# Patient Record
Sex: MALE | Race: BLACK OR AFRICAN AMERICAN | NOT HISPANIC OR LATINO | Employment: OTHER | URBAN - METROPOLITAN AREA
[De-identification: names, ages, dates, MRNs, and addresses within clinical notes are randomized per-mention and may not be internally consistent; named-entity substitution may affect disease eponyms.]

---

## 2018-02-05 ENCOUNTER — TELEPHONE (OUTPATIENT)
Dept: NEUROSURGERY | Facility: CLINIC | Age: 58
End: 2018-02-05

## 2018-02-05 NOTE — TELEPHONE ENCOUNTER
----- Message from Stacia Bright sent at 2/5/2018 12:40 PM CST -----  Contact: Stacia (sister) @ [phone]  Calling to schedule a 1 year f/u with Dr Gonzalez.  Pls call with an appt.

## 2018-02-05 NOTE — TELEPHONE ENCOUNTER
YARITZA ESTES, PTS SISTER, PT WAS DC IN 11/2016 FROM [facility]. EYESIGHT IS STABLE & PT IS DOING WELL. HAD QUESTIONS ABOUT HIS CARBAMAZEPINE, DIRECTED TO NEUROLOGY TO FU AS THEY PRESCRIBE THAT MEDICATION. SHE UNDERSTOOD.

## 2024-08-10 ENCOUNTER — HOSPITAL ENCOUNTER (INPATIENT)
Facility: OTHER | Age: 64
LOS: 2 days | Discharge: HOME | DRG: 641 | End: 2024-08-12
Attending: EMERGENCY MEDICINE | Admitting: HOSPITALIST
Payer: MEDICARE

## 2024-08-10 DIAGNOSIS — R53.1 GENERALIZED WEAKNESS: ICD-10-CM

## 2024-08-10 DIAGNOSIS — E87.1 HYPONATREMIA: Primary | ICD-10-CM

## 2024-08-10 DIAGNOSIS — R07.9 CHEST PAIN: ICD-10-CM

## 2024-08-10 DIAGNOSIS — R06.02 SOB (SHORTNESS OF BREATH): ICD-10-CM

## 2024-08-10 PROBLEM — C34.32 PRIMARY CANCER OF LEFT LOWER LOBE OF LUNG: Status: ACTIVE | Noted: 2024-06-26

## 2024-08-10 LAB
ALBUMIN SERPL BCP-MCNC: 3.8 G/DL (ref 3.5–5.2)
ALP SERPL-CCNC: 100 U/L (ref 55–135)
ALT SERPL W/O P-5'-P-CCNC: 19 U/L (ref 10–44)
ANION GAP SERPL CALC-SCNC: 11 MMOL/L (ref 8–16)
AST SERPL-CCNC: 27 U/L (ref 10–40)
BASOPHILS # BLD AUTO: 0.02 K/UL (ref 0–0.2)
BASOPHILS NFR BLD: 0.3 % (ref 0–1.9)
BILIRUB SERPL-MCNC: 0.3 MG/DL (ref 0.1–1)
BILIRUB UR QL STRIP: NEGATIVE
BUN SERPL-MCNC: 10 MG/DL (ref 8–23)
CALCIUM SERPL-MCNC: 8.8 MG/DL (ref 8.7–10.5)
CHLORIDE SERPL-SCNC: 88 MMOL/L (ref 95–110)
CLARITY UR: CLEAR
CO2 SERPL-SCNC: 23 MMOL/L (ref 23–29)
COLOR UR: COLORLESS
CREAT SERPL-MCNC: 0.9 MG/DL (ref 0.5–1.4)
CTP QC/QA: YES
DIFFERENTIAL METHOD BLD: ABNORMAL
EOSINOPHIL # BLD AUTO: 0 K/UL (ref 0–0.5)
EOSINOPHIL NFR BLD: 0 % (ref 0–8)
ERYTHROCYTE [DISTWIDTH] IN BLOOD BY AUTOMATED COUNT: 12.3 % (ref 11.5–14.5)
EST. GFR  (NO RACE VARIABLE): >60 ML/MIN/1.73 M^2
GLUCOSE SERPL-MCNC: 99 MG/DL (ref 70–110)
GLUCOSE UR QL STRIP: NEGATIVE
HCT VFR BLD AUTO: 34.5 % (ref 40–54)
HGB BLD-MCNC: 12.7 G/DL (ref 14–18)
HGB UR QL STRIP: NEGATIVE
IMM GRANULOCYTES # BLD AUTO: 0.02 K/UL (ref 0–0.04)
IMM GRANULOCYTES NFR BLD AUTO: 0.3 % (ref 0–0.5)
KETONES UR QL STRIP: NEGATIVE
LEUKOCYTE ESTERASE UR QL STRIP: NEGATIVE
LYMPHOCYTES # BLD AUTO: 1.4 K/UL (ref 1–4.8)
LYMPHOCYTES NFR BLD: 24.1 % (ref 18–48)
MCH RBC QN AUTO: 31.7 PG (ref 27–31)
MCHC RBC AUTO-ENTMCNC: 36.8 G/DL (ref 32–36)
MCV RBC AUTO: 86 FL (ref 82–98)
MONOCYTES # BLD AUTO: 0.6 K/UL (ref 0.3–1)
MONOCYTES NFR BLD: 10.6 % (ref 4–15)
NEUTROPHILS # BLD AUTO: 3.8 K/UL (ref 1.8–7.7)
NEUTROPHILS NFR BLD: 64.7 % (ref 38–73)
NITRITE UR QL STRIP: NEGATIVE
NRBC BLD-RTO: 0 /100 WBC
OSMOLALITY SERPL: 256 MOSM/KG (ref 280–300)
OSMOLALITY UR: 248 MOSM/KG (ref 50–1200)
PH UR STRIP: 6 [PH] (ref 5–8)
PLATELET # BLD AUTO: 183 K/UL (ref 150–450)
PMV BLD AUTO: 9 FL (ref 9.2–12.9)
POTASSIUM SERPL-SCNC: 4 MMOL/L (ref 3.5–5.1)
PROT SERPL-MCNC: 7.6 G/DL (ref 6–8.4)
PROT UR QL STRIP: NEGATIVE
RBC # BLD AUTO: 4.01 M/UL (ref 4.6–6.2)
SARS-COV-2 RDRP RESP QL NAA+PROBE: NEGATIVE
SODIUM SERPL-SCNC: 122 MMOL/L (ref 136–145)
SODIUM UR-SCNC: 39 MMOL/L (ref 20–250)
SP GR UR STRIP: 1.01 (ref 1–1.03)
URN SPEC COLLECT METH UR: ABNORMAL
UROBILINOGEN UR STRIP-ACNC: NEGATIVE EU/DL
WBC # BLD AUTO: 5.94 K/UL (ref 3.9–12.7)

## 2024-08-10 PROCEDURE — 63600175 PHARM REV CODE 636 W HCPCS: Performed by: EMERGENCY MEDICINE

## 2024-08-10 PROCEDURE — 12000002 HC ACUTE/MED SURGE SEMI-PRIVATE ROOM

## 2024-08-10 PROCEDURE — 20000000 HC ICU ROOM

## 2024-08-10 PROCEDURE — 84300 ASSAY OF URINE SODIUM: CPT | Performed by: EMERGENCY MEDICINE

## 2024-08-10 PROCEDURE — 96360 HYDRATION IV INFUSION INIT: CPT

## 2024-08-10 PROCEDURE — 93005 ELECTROCARDIOGRAM TRACING: CPT

## 2024-08-10 PROCEDURE — 99285 EMERGENCY DEPT VISIT HI MDM: CPT | Mod: 25

## 2024-08-10 PROCEDURE — 87635 SARS-COV-2 COVID-19 AMP PRB: CPT | Performed by: EMERGENCY MEDICINE

## 2024-08-10 PROCEDURE — 81003 URINALYSIS AUTO W/O SCOPE: CPT | Performed by: PHYSICIAN ASSISTANT

## 2024-08-10 PROCEDURE — 83930 ASSAY OF BLOOD OSMOLALITY: CPT | Performed by: PHYSICIAN ASSISTANT

## 2024-08-10 PROCEDURE — 85025 COMPLETE CBC W/AUTO DIFF WBC: CPT | Performed by: PHYSICIAN ASSISTANT

## 2024-08-10 PROCEDURE — 93010 ELECTROCARDIOGRAM REPORT: CPT | Mod: ,,, | Performed by: INTERNAL MEDICINE

## 2024-08-10 PROCEDURE — 80053 COMPREHEN METABOLIC PANEL: CPT | Performed by: PHYSICIAN ASSISTANT

## 2024-08-10 PROCEDURE — 83935 ASSAY OF URINE OSMOLALITY: CPT | Performed by: EMERGENCY MEDICINE

## 2024-08-10 RX ORDER — BENZTROPINE MESYLATE 1 MG/1
1 TABLET ORAL 2 TIMES DAILY
Status: DISCONTINUED | OUTPATIENT
Start: 2024-08-11 | End: 2024-08-12 | Stop reason: HOSPADM

## 2024-08-10 RX ORDER — UMECLIDINIUM BROMIDE AND VILANTEROL TRIFENATATE 62.5; 25 UG/1; UG/1
1 POWDER RESPIRATORY (INHALATION) DAILY
COMMUNITY
Start: 2024-06-10 | End: 2025-06-10

## 2024-08-10 RX ORDER — ALBUTEROL SULFATE 90 UG/1
2 INHALANT RESPIRATORY (INHALATION) EVERY 4 HOURS PRN
COMMUNITY
Start: 2024-03-21

## 2024-08-10 RX ORDER — ALUMINUM HYDROXIDE, MAGNESIUM HYDROXIDE, AND SIMETHICONE 1200; 120; 1200 MG/30ML; MG/30ML; MG/30ML
30 SUSPENSION ORAL 4 TIMES DAILY PRN
Status: DISCONTINUED | OUTPATIENT
Start: 2024-08-11 | End: 2024-08-12 | Stop reason: HOSPADM

## 2024-08-10 RX ORDER — SIMETHICONE 80 MG
1 TABLET,CHEWABLE ORAL 4 TIMES DAILY PRN
Status: DISCONTINUED | OUTPATIENT
Start: 2024-08-11 | End: 2024-08-12 | Stop reason: HOSPADM

## 2024-08-10 RX ORDER — RISPERIDONE 1 MG/1
3 TABLET ORAL DAILY
Status: DISCONTINUED | OUTPATIENT
Start: 2024-08-11 | End: 2024-08-11

## 2024-08-10 RX ORDER — NALOXONE HCL 0.4 MG/ML
0.02 VIAL (ML) INJECTION
Status: DISCONTINUED | OUTPATIENT
Start: 2024-08-11 | End: 2024-08-12 | Stop reason: HOSPADM

## 2024-08-10 RX ORDER — TALC
6 POWDER (GRAM) TOPICAL NIGHTLY PRN
Status: DISCONTINUED | OUTPATIENT
Start: 2024-08-11 | End: 2024-08-12 | Stop reason: HOSPADM

## 2024-08-10 RX ORDER — PROCHLORPERAZINE EDISYLATE 5 MG/ML
5 INJECTION INTRAMUSCULAR; INTRAVENOUS EVERY 6 HOURS PRN
Status: DISCONTINUED | OUTPATIENT
Start: 2024-08-11 | End: 2024-08-12 | Stop reason: HOSPADM

## 2024-08-10 RX ORDER — ACETAMINOPHEN 325 MG/1
650 TABLET ORAL EVERY 8 HOURS PRN
Status: DISCONTINUED | OUTPATIENT
Start: 2024-08-11 | End: 2024-08-12 | Stop reason: HOSPADM

## 2024-08-10 RX ORDER — ONDANSETRON HYDROCHLORIDE 2 MG/ML
4 INJECTION, SOLUTION INTRAVENOUS EVERY 6 HOURS PRN
Status: DISCONTINUED | OUTPATIENT
Start: 2024-08-11 | End: 2024-08-12 | Stop reason: HOSPADM

## 2024-08-10 RX ORDER — CARBAMAZEPINE 200 MG/1
200 TABLET ORAL EVERY MORNING
Status: DISCONTINUED | OUTPATIENT
Start: 2024-08-11 | End: 2024-08-12 | Stop reason: HOSPADM

## 2024-08-10 RX ORDER — AMOXICILLIN 250 MG
1 CAPSULE ORAL 2 TIMES DAILY
Status: DISCONTINUED | OUTPATIENT
Start: 2024-08-10 | End: 2024-08-12 | Stop reason: HOSPADM

## 2024-08-10 RX ORDER — ALBUTEROL SULFATE 90 UG/1
2 INHALANT RESPIRATORY (INHALATION) EVERY 4 HOURS PRN
Status: DISCONTINUED | OUTPATIENT
Start: 2024-08-11 | End: 2024-08-12 | Stop reason: HOSPADM

## 2024-08-10 RX ORDER — CARBAMAZEPINE 200 MG/1
400 TABLET ORAL NIGHTLY
Status: DISCONTINUED | OUTPATIENT
Start: 2024-08-11 | End: 2024-08-12 | Stop reason: HOSPADM

## 2024-08-10 RX ORDER — SODIUM CHLORIDE 0.9 % (FLUSH) 0.9 %
10 SYRINGE (ML) INJECTION EVERY 8 HOURS PRN
Status: DISCONTINUED | OUTPATIENT
Start: 2024-08-11 | End: 2024-08-12 | Stop reason: HOSPADM

## 2024-08-10 RX ADMIN — SODIUM CHLORIDE, POTASSIUM CHLORIDE, SODIUM LACTATE AND CALCIUM CHLORIDE 1000 ML: 600; 310; 30; 20 INJECTION, SOLUTION INTRAVENOUS at 08:08

## 2024-08-10 NOTE — FIRST PROVIDER EVALUATION
Emergency Department TeleTriage Encounter Note      CHIEF COMPLAINT    Chief Complaint   Patient presents with    Fatigue     Pt has stage 3 lung cancer and has been having severe fatigue in the last three days, wife states pt has become incontinent of urine and bowels since Friday.          VITAL SIGNS   Initial Vitals [08/10/24 1751]   BP Pulse Resp Temp SpO2   132/84 86 18 97.7 °F (36.5 °C) 97 %      MAP       --            ALLERGIES    Review of patient's allergies indicates:  No Known Allergies    PROVIDER TRIAGE NOTE  Patient presents with increased fatigue, weakness, urinary incontinence. He has stage 3 lung cancer still being worked up and has not started treatment yet.       ORDERS  Labs Reviewed - No data to display    ED Orders (720h ago, onward)      None              Virtual Visit Note: The provider triage portion of this emergency department evaluation and documentation was performed via [employer], a HIPAA-compliant telemedicine application, in concert with a tele-presenter in the room. A face to face patient evaluation with one of my colleagues will occur once the patient is placed in an emergency department room.      DISCLAIMER: This note was prepared with [employer] voice recognition transcription software. Garbled syntax, mangled pronouns, and other bizarre constructions may be attributed to that software system.

## 2024-08-11 LAB
ANION GAP SERPL CALC-SCNC: 8 MMOL/L (ref 8–16)
BASOPHILS # BLD AUTO: 0.02 K/UL (ref 0–0.2)
BASOPHILS NFR BLD: 0.4 % (ref 0–1.9)
BUN SERPL-MCNC: 9 MG/DL (ref 8–23)
CALCIUM SERPL-MCNC: 8.8 MG/DL (ref 8.7–10.5)
CHLORIDE SERPL-SCNC: 94 MMOL/L (ref 95–110)
CO2 SERPL-SCNC: 26 MMOL/L (ref 23–29)
CORTIS SERPL-MCNC: 8.1 UG/DL (ref 4.3–22.4)
CREAT SERPL-MCNC: 0.9 MG/DL (ref 0.5–1.4)
DIFFERENTIAL METHOD BLD: ABNORMAL
EOSINOPHIL # BLD AUTO: 0 K/UL (ref 0–0.5)
EOSINOPHIL NFR BLD: 0 % (ref 0–8)
ERYTHROCYTE [DISTWIDTH] IN BLOOD BY AUTOMATED COUNT: 12.3 % (ref 11.5–14.5)
EST. GFR  (NO RACE VARIABLE): >60 ML/MIN/1.73 M^2
GLUCOSE SERPL-MCNC: 93 MG/DL (ref 70–110)
HCT VFR BLD AUTO: 32.9 % (ref 40–54)
HGB BLD-MCNC: 11.8 G/DL (ref 14–18)
IMM GRANULOCYTES # BLD AUTO: 0.02 K/UL (ref 0–0.04)
IMM GRANULOCYTES NFR BLD AUTO: 0.4 % (ref 0–0.5)
LYMPHOCYTES # BLD AUTO: 1.7 K/UL (ref 1–4.8)
LYMPHOCYTES NFR BLD: 31.6 % (ref 18–48)
MCH RBC QN AUTO: 31.2 PG (ref 27–31)
MCHC RBC AUTO-ENTMCNC: 35.9 G/DL (ref 32–36)
MCV RBC AUTO: 87 FL (ref 82–98)
MONOCYTES # BLD AUTO: 0.7 K/UL (ref 0.3–1)
MONOCYTES NFR BLD: 12.5 % (ref 4–15)
NEUTROPHILS # BLD AUTO: 2.9 K/UL (ref 1.8–7.7)
NEUTROPHILS NFR BLD: 55.1 % (ref 38–73)
NRBC BLD-RTO: 0 /100 WBC
OHS QRS DURATION: 80 MS
OHS QRS DURATION: 82 MS
OHS QTC CALCULATION: 437 MS
OHS QTC CALCULATION: 439 MS
PLATELET # BLD AUTO: 170 K/UL (ref 150–450)
PMV BLD AUTO: 9 FL (ref 9.2–12.9)
POTASSIUM SERPL-SCNC: 3.9 MMOL/L (ref 3.5–5.1)
RBC # BLD AUTO: 3.78 M/UL (ref 4.6–6.2)
SODIUM SERPL-SCNC: 125 MMOL/L (ref 136–145)
SODIUM SERPL-SCNC: 128 MMOL/L (ref 136–145)
SODIUM SERPL-SCNC: 128 MMOL/L (ref 136–145)
SODIUM SERPL-SCNC: 129 MMOL/L (ref 136–145)
SODIUM SERPL-SCNC: 131 MMOL/L (ref 136–145)
SODIUM SERPL-SCNC: 134 MMOL/L (ref 136–145)
TSH SERPL DL<=0.005 MIU/L-ACNC: 1.85 UIU/ML (ref 0.4–4)
WBC # BLD AUTO: 5.26 K/UL (ref 3.9–12.7)

## 2024-08-11 PROCEDURE — 25000003 PHARM REV CODE 250: Performed by: NURSE PRACTITIONER

## 2024-08-11 PROCEDURE — 20000000 HC ICU ROOM

## 2024-08-11 PROCEDURE — 84295 ASSAY OF SERUM SODIUM: CPT | Mod: 91 | Performed by: NURSE PRACTITIONER

## 2024-08-11 PROCEDURE — 63600175 PHARM REV CODE 636 W HCPCS: Performed by: HOSPITALIST

## 2024-08-11 PROCEDURE — 25000003 PHARM REV CODE 250: Performed by: INTERNAL MEDICINE

## 2024-08-11 PROCEDURE — 85025 COMPLETE CBC W/AUTO DIFF WBC: CPT | Performed by: NURSE PRACTITIONER

## 2024-08-11 PROCEDURE — 80048 BASIC METABOLIC PNL TOTAL CA: CPT | Performed by: NURSE PRACTITIONER

## 2024-08-11 PROCEDURE — 99900035 HC TECH TIME PER 15 MIN (STAT)

## 2024-08-11 PROCEDURE — 25000003 PHARM REV CODE 250: Performed by: HOSPITALIST

## 2024-08-11 PROCEDURE — 84443 ASSAY THYROID STIM HORMONE: CPT | Performed by: NURSE PRACTITIONER

## 2024-08-11 PROCEDURE — 25500020 PHARM REV CODE 255: Performed by: HOSPITALIST

## 2024-08-11 PROCEDURE — 82533 TOTAL CORTISOL: CPT | Performed by: NURSE PRACTITIONER

## 2024-08-11 PROCEDURE — A9585 GADOBUTROL INJECTION: HCPCS | Performed by: HOSPITALIST

## 2024-08-11 PROCEDURE — 94761 N-INVAS EAR/PLS OXIMETRY MLT: CPT

## 2024-08-11 PROCEDURE — 36415 COLL VENOUS BLD VENIPUNCTURE: CPT | Mod: XB | Performed by: NURSE PRACTITIONER

## 2024-08-11 RX ORDER — DEXTROSE MONOHYDRATE 50 MG/ML
INJECTION, SOLUTION INTRAVENOUS ONCE
Status: DISCONTINUED | OUTPATIENT
Start: 2024-08-11 | End: 2024-08-11

## 2024-08-11 RX ORDER — GADOBUTROL 604.72 MG/ML
5 INJECTION INTRAVENOUS
Status: COMPLETED | OUTPATIENT
Start: 2024-08-11 | End: 2024-08-11

## 2024-08-11 RX ORDER — BENZTROPINE MESYLATE 1 MG/1
1 TABLET ORAL 2 TIMES DAILY
COMMUNITY
Start: 2024-01-29

## 2024-08-11 RX ORDER — DEXTROSE MONOHYDRATE 50 MG/ML
INJECTION, SOLUTION INTRAVENOUS CONTINUOUS
Status: DISCONTINUED | OUTPATIENT
Start: 2024-08-11 | End: 2024-08-11

## 2024-08-11 RX ORDER — MUPIROCIN 20 MG/G
OINTMENT TOPICAL 2 TIMES DAILY
Status: DISCONTINUED | OUTPATIENT
Start: 2024-08-11 | End: 2024-08-12 | Stop reason: HOSPADM

## 2024-08-11 RX ORDER — DEXTROSE MONOHYDRATE 50 MG/ML
INJECTION, SOLUTION INTRAVENOUS ONCE
Status: COMPLETED | OUTPATIENT
Start: 2024-08-11 | End: 2024-08-11

## 2024-08-11 RX ORDER — RISPERIDONE 3 MG/1
1 TABLET ORAL 2 TIMES DAILY
COMMUNITY
Start: 2024-01-26

## 2024-08-11 RX ORDER — ENOXAPARIN SODIUM 100 MG/ML
40 INJECTION SUBCUTANEOUS EVERY 24 HOURS
Status: DISCONTINUED | OUTPATIENT
Start: 2024-08-11 | End: 2024-08-12 | Stop reason: HOSPADM

## 2024-08-11 RX ORDER — RISPERIDONE 1 MG/1
3 TABLET ORAL 2 TIMES DAILY
Status: DISCONTINUED | OUTPATIENT
Start: 2024-08-11 | End: 2024-08-12 | Stop reason: HOSPADM

## 2024-08-11 RX ADMIN — MUPIROCIN: 20 OINTMENT TOPICAL at 08:08

## 2024-08-11 RX ADMIN — BENZTROPINE MESYLATE 1 MG: 1 TABLET ORAL at 08:08

## 2024-08-11 RX ADMIN — CARBAMAZEPINE 200 MG: 200 TABLET ORAL at 07:08

## 2024-08-11 RX ADMIN — GADOBUTROL 5 ML: 604.72 INJECTION INTRAVENOUS at 10:08

## 2024-08-11 RX ADMIN — RISPERIDONE 3 MG: 1 TABLET, FILM COATED ORAL at 08:08

## 2024-08-11 RX ADMIN — DEXTROSE MONOHYDRATE: 50 INJECTION, SOLUTION INTRAVENOUS at 08:08

## 2024-08-11 RX ADMIN — DOCUSATE SODIUM AND SENNOSIDES 1 TABLET: 8.6; 5 TABLET, FILM COATED ORAL at 08:08

## 2024-08-11 RX ADMIN — CARBAMAZEPINE 400 MG: 200 TABLET ORAL at 08:08

## 2024-08-11 RX ADMIN — ENOXAPARIN SODIUM 40 MG: 40 INJECTION SUBCUTANEOUS at 05:08

## 2024-08-11 NOTE — NURSING
Nurses Note -- 4 Eyes      8/11/2024   12:46 AM      Skin assessed during: Admit      [x] No Altered Skin Integrity Present    [x]Prevention Measures Documented      [] Yes- Altered Skin Integrity Present or Discovered   [] LDA Added if Not in Epic (Describe Wound)   [] New Altered Skin Integrity was Present on Admit and Documented in LDA   [] Wound Image Taken    Wound Care Consulted? No    Attending Nurse:  Park Barker RN/Staff Member:  with Rozina, RN (4 eyes on skin done)

## 2024-08-11 NOTE — HPI
"Per Val Askew, NP:    "Bulmaro Tabares is a 64-year-old male with a past medical history of bipolar disorder, seizures and recently diagnosed left lower lobe squamous cell carcinoma (06/2024) who presents with increased fatigue, weakness and urinary incontinence for the past 3 days.  Patient's sister helps with caregiving and states patient is not incontinent at baseline.  Per chart review, patient recently diagnosed with lung cancer and is followed at [facility] by Dr. Dempsey.  Patient denies chest pain, shortness of breath, nausea, vomiting, urinary symptoms, fever and chills.    ED workup significant for sodium 122, COVID and negative, CBC unremarkable.  Patient received 1 L IV LR in the ED and was referred to Hospital Medicine.  Hospital medicine discussed with Nephrology on-call, Dr. Trujillo, and recommendations appreciated.  Patient will be admitted for further evaluation management"  "

## 2024-08-11 NOTE — ASSESSMENT & PLAN NOTE
Sister reports he to control seizures until switch to carbamazepine 200 mg in the morning and 400 mg at night.  Serum sodium proving.  Continue home seizure medication for now.  May need to consider alternative therapy if hyponatremia persists as carbamazepine can contribute to hyponatremia.

## 2024-08-11 NOTE — H&P
[facility] Intensive [facility] Medicine  History & Physical    Patient Name: Bulmaro Tabares  MRN: [id number]  Patient Class: IP- Inpatient  Admission Date: 8/10/2024  Attending Physician: Dylon Figueroa MD   Primary Care Provider: [facility]         Patient information was obtained from patient, relative(s), past medical records, and ER records.     Subjective:     Principal Problem:Hyponatremia    Chief Complaint:   Chief Complaint   Patient presents with    Fatigue     Pt has stage 3 lung cancer and has been having severe fatigue in the last three days, wife states pt has become incontinent of urine and bowels since Friday.           HPI: Bulmaro Tabares is a 64-year-old male with a past medical history of bipolar disorder, seizures and recently diagnosed left lower lobe squamous cell carcinoma (06/2024) who presents with increased fatigue, weakness and urinary incontinence for the past 3 days.  Patient's sister helps with caregiving and states patient is not incontinent at baseline.  Per chart review, patient recently diagnosed with lung cancer and is followed at [facility] by Dr. Dempsey.  Patient denies chest pain, shortness of breath, nausea, vomiting, urinary symptoms, fever and chills.    ED workup significant for sodium 122, COVID and negative, CBC unremarkable.  Patient received 1 L IV LR in the ED and was referred to Hospital Medicine.  Hospital medicine discussed with Nephrology on-call, Dr. Trujillo, and recommendations appreciated.  Patient will be admitted for further evaluation management    Past Medical History:   Diagnosis Date    Bipolar disorder     Schizoaffective disorder        History reviewed. No pertinent surgical history.    Review of patient's allergies indicates:  No Known Allergies    No current facility-administered medications on file prior to encounter.     Current Outpatient Medications on File Prior to Encounter   Medication Sig    albuterol (PROVENTIL/VENTOLIN HFA) 90  mcg/actuation inhaler Inhale 2 puffs into the lungs every 4 (four) hours as needed for Shortness of Breath.    ANORO ELLIPTA 62.5-25 mcg/actuation DsDv Inhale 1 puff into the lungs once daily.    benztropine (COGENTIN) 1 MG tablet 1 mg 2 (two) times daily.     carbamazepine (TEGRETOL) 200 mg tablet Take one tablet by mouth every morning and two tablets by mouth every night at bedtime.    risperidone (RISPERDAL) 3 MG Tab Take 3 mg by mouth once daily.     aspirin (ECOTRIN) 81 MG EC tablet Take 1 tablet (81 mg total) by mouth once daily.    atorvastatin (LIPITOR) 40 MG tablet Take 1 tablet (40 mg total) by mouth once daily.    vitamin D 1000 units Tab Take 185 mg by mouth once daily.     Family History       Problem Relation (Age of Onset)    Asthma Mother    Diabetes Sister, Brother    Heart attack Father    Heart disease Father    Hypertension Brother    Seizures Sister          Tobacco Use    Smoking status: Every Day     Current packs/day: 1.00     Types: Cigarettes    Smokeless tobacco: Not on file   Substance and Sexual Activity    Alcohol use: No    Drug use: No    Sexual activity: Not on file     Review of Systems   Constitutional:  Positive for fatigue. Negative for activity change, appetite change, chills and fever.   HENT:  Negative for congestion, sore throat and trouble swallowing.    Eyes:  Negative for photophobia and visual disturbance.   Respiratory:  Negative for cough, chest tightness and shortness of breath.    Cardiovascular:  Negative for chest pain, palpitations and leg swelling.   Gastrointestinal:  Negative for abdominal pain, diarrhea and nausea.   Genitourinary:  Negative for dysuria, flank pain and hematuria.        Incontinence   Musculoskeletal:  Negative for back pain.   Neurological:  Positive for weakness. Negative for dizziness and headaches.   Psychiatric/Behavioral:  Negative for confusion.      Objective:     Vital Signs (Most Recent):  Temp: 97.7 °F (36.5 °C) (08/10/24  1751)  Pulse: 64 (08/10/24 2304)  Resp: 18 (08/10/24 1751)  BP: (!) 153/86 (08/10/24 2304)  SpO2: 99 % (08/10/24 2304) Vital Signs (24h Range):  Temp:  [97.7 °F (36.5 °C)] 97.7 °F (36.5 °C)  Pulse:  [64-86] 64  Resp:  [18] 18  SpO2:  [97 %-99 %] 99 %  BP: (132-153)/(79-86) 153/86     Weight: 54 kg (119 lb)  Body mass index is 19.21 kg/m².     Physical Exam  Vitals reviewed.   Constitutional:       Appearance: Normal appearance. He is underweight. He is ill-appearing.   HENT:      Head: Normocephalic.      Mouth/Throat:      Mouth: Mucous membranes are moist.      Pharynx: Oropharynx is clear.   Eyes:      General: Lids are normal. Gaze aligned appropriately.      Conjunctiva/sclera: Conjunctivae normal.   Cardiovascular:      Rate and Rhythm: Normal rate and regular rhythm.      Pulses: Normal pulses.      Heart sounds: Normal heart sounds.   Pulmonary:      Effort: Pulmonary effort is normal.      Breath sounds: Examination of the left-lower field reveals decreased breath sounds. Decreased breath sounds present.   Abdominal:      General: Abdomen is flat. Bowel sounds are normal.      Palpations: Abdomen is soft.   Musculoskeletal:         General: Normal range of motion.      Cervical back: Normal range of motion.   Skin:     General: Skin is warm and dry.   Neurological:      Mental Status: He is alert and oriented to person, place, and time. Mental status is at baseline.   Psychiatric:         Mood and Affect: Mood normal.                Significant Labs: All pertinent labs within the past 24 hours have been reviewed.  CBC:   Recent Labs   Lab 08/10/24  1820   WBC 5.94   HGB 12.7*   HCT 34.5*        CMP:   Recent Labs   Lab 08/10/24  1820   *   K 4.0   CL 88*   CO2 23   GLU 99   BUN 10   CREATININE 0.9   CALCIUM 8.8   PROT 7.6   ALBUMIN 3.8   BILITOT 0.3   ALKPHOS 100   AST 27   ALT 19   ANIONGAP 11       Significant Imaging: I have reviewed all pertinent imaging results/findings within the past 24  hours.  Imaging Results              X-Ray Chest AP Portable (Final result)  Result time 08/10/24 20:48:55      Final result by Benjie Mchugh MD (08/10/24 20:48:55)                   Impression:      No acute cardiopulmonary process identified.      Electronically signed by: Benjie Mchugh MD  Date:    08/10/2024  Time:    20:48               Narrative:    EXAMINATION:  XR CHEST AP PORTABLE    CLINICAL HISTORY:  Shortness of breath    TECHNIQUE:  Single frontal view of the chest was performed.    COMPARISON:  August 2016.    FINDINGS:  Cardiac silhouette is normal in size.  Lungs are symmetrically expanded.  No evidence of focal consolidative process, pneumothorax, or significant pleural effusion.  No acute osseous abnormality identified.                                    Assessment/Plan:     * Hyponatremia  Patient presenting with sodium 122, prior sodium 127 on 03/2024.      Discussed with Nephrology on-call, , and patient placed on free water restriction. OK to give home tegretol per Nephro.     - Nephrology to consult, recommendations appreciated  - Correct the sodium by 4-6mEq in 24 hours.   - Obtain the following studies: Urine sodium, urine osmolality, serum osmolality.  - Will treat the hyponatremia with free water restriction  - Monitor sodium Every 6 hours.       Primary cancer of left lower lobe of lung  Patient recent was with left lower lobe squamous cell carcinoma.  Followed by Dr. Dempsey at [facility].      -Patient has not started treatment and is still undergoing full workup.   -continue Anoro-Ellipta-not on formulary and sister will bring from home in AM  -PRN albuterol      Bipolar disorder  History noted.  Currently taking Cogentin and Risperdal      -Continue Cogentin and Risperdal      Seizure disorder  History noted.  Per patient's sister he has not had any recent seizures and it has been well controlled with Tegretol    -okay to continue Tegretol given hyponatremia per  Nephrology.  -seizure precautions        VTE Risk Mitigation (From admission, onward)           Ordered     IP VTE LOW RISK PATIENT  Once         08/10/24 2330     Place sequential compression device  Until discontinued         08/10/24 2330                         Val Askew NP  Department of [facility] Medicine  [facility] - Intensive Care ([place])

## 2024-08-11 NOTE — SUBJECTIVE & OBJECTIVE
Past Medical History:   Diagnosis Date    Bipolar disorder     Schizoaffective disorder        History reviewed. No pertinent surgical history.    Review of patient's allergies indicates:  No Known Allergies    No current facility-administered medications on file prior to encounter.     Current Outpatient Medications on File Prior to Encounter   Medication Sig    albuterol (PROVENTIL/VENTOLIN HFA) 90 mcg/actuation inhaler Inhale 2 puffs into the lungs every 4 (four) hours as needed for Shortness of Breath.    ANORO ELLIPTA 62.5-25 mcg/actuation DsDv Inhale 1 puff into the lungs once daily.    benztropine (COGENTIN) 1 MG tablet 1 mg 2 (two) times daily.     carbamazepine (TEGRETOL) 200 mg tablet Take one tablet by mouth every morning and two tablets by mouth every night at bedtime.    risperidone (RISPERDAL) 3 MG Tab Take 3 mg by mouth once daily.     aspirin (ECOTRIN) 81 MG EC tablet Take 1 tablet (81 mg total) by mouth once daily.    atorvastatin (LIPITOR) 40 MG tablet Take 1 tablet (40 mg total) by mouth once daily.    vitamin D 1000 units Tab Take 185 mg by mouth once daily.     Family History       Problem Relation (Age of Onset)    Asthma Mother    Diabetes Sister, Brother    Heart attack Father    Heart disease Father    Hypertension Brother    Seizures Sister          Tobacco Use    Smoking status: Every Day     Current packs/day: 1.00     Types: Cigarettes    Smokeless tobacco: Not on file   Substance and Sexual Activity    Alcohol use: No    Drug use: No    Sexual activity: Not on file     Review of Systems   Constitutional:  Positive for fatigue. Negative for activity change, appetite change, chills and fever.   HENT:  Negative for congestion, sore throat and trouble swallowing.    Eyes:  Negative for photophobia and visual disturbance.   Respiratory:  Negative for cough, chest tightness and shortness of breath.    Cardiovascular:  Negative for chest pain, palpitations and leg swelling.   Gastrointestinal:   Negative for abdominal pain, diarrhea and nausea.   Genitourinary:  Negative for dysuria, flank pain and hematuria.        Incontinence   Musculoskeletal:  Negative for back pain.   Neurological:  Positive for weakness. Negative for dizziness and headaches.   Psychiatric/Behavioral:  Negative for confusion.      Objective:     Vital Signs (Most Recent):  Temp: 97.7 °F (36.5 °C) (08/10/24 1751)  Pulse: 64 (08/10/24 2304)  Resp: 18 (08/10/24 1751)  BP: (!) 153/86 (08/10/24 2304)  SpO2: 99 % (08/10/24 2304) Vital Signs (24h Range):  Temp:  [97.7 °F (36.5 °C)] 97.7 °F (36.5 °C)  Pulse:  [64-86] 64  Resp:  [18] 18  SpO2:  [97 %-99 %] 99 %  BP: (132-153)/(79-86) 153/86     Weight: 54 kg (119 lb)  Body mass index is 19.21 kg/m².     Physical Exam  Vitals reviewed.   Constitutional:       Appearance: Normal appearance. He is underweight. He is ill-appearing.   HENT:      Head: Normocephalic.      Mouth/Throat:      Mouth: Mucous membranes are moist.      Pharynx: Oropharynx is clear.   Eyes:      General: Lids are normal. Gaze aligned appropriately.      Conjunctiva/sclera: Conjunctivae normal.   Cardiovascular:      Rate and Rhythm: Normal rate and regular rhythm.      Pulses: Normal pulses.      Heart sounds: Normal heart sounds.   Pulmonary:      Effort: Pulmonary effort is normal.      Breath sounds: Examination of the left-lower field reveals decreased breath sounds. Decreased breath sounds present.   Abdominal:      General: Abdomen is flat. Bowel sounds are normal.      Palpations: Abdomen is soft.   Musculoskeletal:         General: Normal range of motion.      Cervical back: Normal range of motion.   Skin:     General: Skin is warm and dry.   Neurological:      Mental Status: He is alert and oriented to person, place, and time. Mental status is at baseline.   Psychiatric:         Mood and Affect: Mood normal.                Significant Labs: All pertinent labs within the past 24 hours have been reviewed.  CBC:    Recent Labs   Lab 08/10/24  1820   WBC 5.94   HGB 12.7*   HCT 34.5*        CMP:   Recent Labs   Lab 08/10/24  1820   *   K 4.0   CL 88*   CO2 23   GLU 99   BUN 10   CREATININE 0.9   CALCIUM 8.8   PROT 7.6   ALBUMIN 3.8   BILITOT 0.3   ALKPHOS 100   AST 27   ALT 19   ANIONGAP 11       Significant Imaging: I have reviewed all pertinent imaging results/findings within the past 24 hours.  Imaging Results              X-Ray Chest AP Portable (Final result)  Result time 08/10/24 20:48:55      Final result by Benjie Mchugh MD (08/10/24 20:48:55)                   Impression:      No acute cardiopulmonary process identified.      Electronically signed by: Benjie Mchugh MD  Date:    08/10/2024  Time:    20:48               Narrative:    EXAMINATION:  XR CHEST AP PORTABLE    CLINICAL HISTORY:  Shortness of breath    TECHNIQUE:  Single frontal view of the chest was performed.    COMPARISON:  August 2016.    FINDINGS:  Cardiac silhouette is normal in size.  Lungs are symmetrically expanded.  No evidence of focal consolidative process, pneumothorax, or significant pleural effusion.  No acute osseous abnormality identified.

## 2024-08-11 NOTE — ASSESSMENT & PLAN NOTE
Possible side effect of seizure medication versus SIADH related to malignancy.  Initial clinical impression concerning for possible hypovolemia. Patient given isotonic intravenous fluids in the emergency department  with improvement serum sodium (125 to 128 mmol/L).  Serum osmolality of 256 mOsm/kg and urine osmolarity 248 mOsm/kg.  TSH pending.  Nephrology consult recommended free water restriction.  Will rule monitor response to free water restriction.

## 2024-08-11 NOTE — ED PROVIDER NOTES
Encounter Date: 8/10/2024       History     Chief Complaint   Patient presents with    Fatigue     Pt has stage 3 lung cancer and has been having severe fatigue in the last three days, wife states pt has become incontinent of urine and bowels since Friday.        64-year-old male with a history of schizophrenia, bipolar disorder, seizure disorder presenting today with family for weakness and tremor.  Sister at bedside provides history, states patient was recently diagnosed with stage III lung cancer and has one more test before he starts treatment at .  For the past several days he has had increased weakness.  He is having difficulty ambulating and occasionally has incontinence because he is not able to get to the bathroom fast enough.  He denies back pain.  No fevers or chills.  He does have a mild cough with runny nose.  Known COVID contacts.      Review of patient's allergies indicates:  No Known Allergies  Past Medical History:   Diagnosis Date    Bipolar disorder     Schizoaffective disorder      History reviewed. No pertinent surgical history.  Family History   Problem Relation Name Age of Onset    Heart disease Father      Heart attack Father      Diabetes Sister      Seizures Sister      Hypertension Brother      Diabetes Brother      Asthma Mother       Social History     Tobacco Use    Smoking status: Every Day     Current packs/day: 1.00     Types: Cigarettes   Substance Use Topics    Alcohol use: No    Drug use: No     Review of Systems    Physical Exam     Initial Vitals [08/10/24 1751]   BP Pulse Resp Temp SpO2   132/84 86 18 97.7 °F (36.5 °C) 97 %      MAP       --         Physical Exam    Nursing note and vitals reviewed.  Constitutional: He appears well-developed and well-nourished. No distress.   HENT:   Mouth/Throat: Oropharynx is clear and moist.   Eyes: Conjunctivae are normal.   Neck: Neck supple.   Cardiovascular:  Normal rate, regular rhythm and intact distal pulses.            Pulmonary/Chest: Breath sounds normal. He has no wheezes. He has no rales.   Abdominal: Abdomen is soft. He exhibits no distension. There is no abdominal tenderness. There is no rebound and no guarding.   Genitourinary:    Genitourinary Comments: - saddle anesthesia     Musculoskeletal:         General: No edema.      Cervical back: Neck supple.      Comments: - C, T, L tenderness     Lymphadenopathy:     He has no cervical adenopathy.   Neurological: He is alert and oriented to person, place, and time. He has normal strength. No cranial nerve deficit or sensory deficit.   Skin: Skin is warm. Capillary refill takes less than 2 seconds. No rash noted.   Psychiatric: He has a normal mood and affect.         ED Course   Procedures  Labs Reviewed   CBC W/ AUTO DIFFERENTIAL - Abnormal       Result Value    WBC 5.94      RBC 4.01 (*)     Hemoglobin 12.7 (*)     Hematocrit 34.5 (*)     MCV 86      MCH 31.7 (*)     MCHC 36.8 (*)     RDW 12.3      Platelets 183      MPV 9.0 (*)     Immature Granulocytes 0.3      Gran # (ANC) 3.8      Immature Grans (Abs) 0.02      Lymph # 1.4      Mono # 0.6      Eos # 0.0      Baso # 0.02      nRBC 0      Gran % 64.7      Lymph % 24.1      Mono % 10.6      Eosinophil % 0.0      Basophil % 0.3      Differential Method Automated     COMPREHENSIVE METABOLIC PANEL - Abnormal    Sodium 122 (*)     Potassium 4.0      Chloride 88 (*)     CO2 23      Glucose 99      BUN 10      Creatinine 0.9      Calcium 8.8      Total Protein 7.6      Albumin 3.8      Total Bilirubin 0.3      Alkaline Phosphatase 100      AST 27      ALT 19      eGFR >60      Anion Gap 11     URINALYSIS, REFLEX TO URINE CULTURE - Abnormal    Specimen UA Urine, Clean Catch      Color, UA Colorless (*)     Appearance, UA Clear      pH, UA 6.0      Specific Gravity, UA 1.010      Protein, UA Negative      Glucose, UA Negative      Ketones, UA Negative      Bilirubin (UA) Negative      Occult Blood UA Negative      Nitrite, UA  Negative      Urobilinogen, UA Negative      Leukocytes, UA Negative      Narrative:     Specimen Source->Urine   OSMOLALITY, SERUM - Abnormal    Osmolality 256 (*)     Narrative:     Serum Osmo critical result(s) called and verbal readback obtained   from KYM Kumar RN by JUSTUS 08/10/2024 20:54   OSMOLALITY, SERUM   SODIUM, URINE, RANDOM    Sodium, Urine 39      Narrative:     Specimen Source->Urine   OSMOLALITY, URINE RANDOM    Osmolality, Urine 248      Narrative:     Specimen Source->Urine   CORTISOL, 8AM   SARS-COV-2 RDRP GENE    POC Rapid COVID Negative       Acceptable Yes            Imaging Results              X-Ray Chest AP Portable (Final result)  Result time 08/10/24 20:48:55      Final result by Benjie Mchugh MD (08/10/24 20:48:55)                   Impression:      No acute cardiopulmonary process identified.      Electronically signed by: Benjie Mchugh MD  Date:    08/10/2024  Time:    20:48               Narrative:    EXAMINATION:  XR CHEST AP PORTABLE    CLINICAL HISTORY:  Shortness of breath    TECHNIQUE:  Single frontal view of the chest was performed.    COMPARISON:  August 2016.    FINDINGS:  Cardiac silhouette is normal in size.  Lungs are symmetrically expanded.  No evidence of focal consolidative process, pneumothorax, or significant pleural effusion.  No acute osseous abnormality identified.                                       Medications   albuterol inhaler 2 puff (has no administration in time range)   benztropine tablet 1 mg (has no administration in time range)   carBAMazepine tablet 200 mg (has no administration in time range)   carBAMazepine tablet 400 mg (has no administration in time range)   risperiDONE tablet 3 mg (has no administration in time range)   sodium chloride 0.9% flush 10 mL (has no administration in time range)   melatonin tablet 6 mg (has no administration in time range)   ondansetron injection 4 mg (has no administration in time range)    prochlorperazine injection Soln 5 mg (has no administration in time range)   senna-docusate 8.6-50 mg per tablet 1 tablet (1 tablet Oral Not Given 8/10/24 6439)   acetaminophen tablet 650 mg (has no administration in time range)   simethicone chewable tablet 80 mg (has no administration in time range)   aluminum-magnesium hydroxide-simethicone 200-200-20 mg/5 mL suspension 30 mL (has no administration in time range)   naloxone 0.4 mg/mL injection 0.02 mg (has no administration in time range)   lactated ringers bolus 1,000 mL (0 mLs Intravenous Stopped 8/10/24 0894)     Medical Decision Making  Emergent evaluation of 64-year-old male presenting today for weakness, tremor.    Vital signs stable.  Overall well-appearing, no acute distress.    Insert diagnoses includes but not limited to electrolyte derangement, dehydration, infectious process such as pneumonia, COVID, UTI.  Reports incontinence but it is likely urge incontinence, patient does not have any saddle anesthesia with normal strength in the lower extremities, low suspicion for cauda equina.    Plan for labs, EKG, cardiac monitor    Labs reviewed with no leukocytosis.  Hemoglobin stable.  CMP concerning for hyponatremia.  Patient appears hypovolemic on exam, IV fluids ordered.  Will continue to monitor.  Serum osmolality, urine sodium, urine osmolality added.    Discussed with hospital medicine, will admit to ICU for continued monitoring pending serum studies.    Amount and/or Complexity of Data Reviewed  External Data Reviewed: notes.     Details: Recent labs 5/2024- Na 135  Labs: ordered. Decision-making details documented in ED Course.  Radiology: ordered.    Risk  Decision regarding hospitalization.              Attending Attestation:         Attending Critical Care:   Critical Care Times:   ==============================================================  Total Critical Care Time - exclusive of procedural time: 35  minutes.  ==============================================================  Critical care reasons: symptomatic hyponatremia.   Critical care was time spent personally by me on the following activities: ordering lab, x-rays, and/or EKG, development of treatment plan with patient or relative, ordering and performing treatments and interventions, evaluation of patient's response to treatment, discussion with consultants and re-evaluation of patient's conition.   Critical Care Condition: potentially life-threatening           ED Course as of 08/11/24 0232   Sat Aug 10, 2024   2007 Hemoglobin(!): 12.7 [GM]   2008 BUN: 10 [GM]   2008 Creatinine: 0.9 [GM]      ED Course User Index  [GM] Sabrina Membreno MD                           Clinical Impression:  Final diagnoses:  [R53.1] Generalized weakness  [R06.02] SOB (shortness of breath)  [E87.1] Hyponatremia          ED Disposition Condition    Admit Stable                Sabrina Membreno MD  08/11/24 0233

## 2024-08-11 NOTE — PROGRESS NOTES
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  Patient's sister helps with caregiving and states patient is not incontinent at baseline.  Per chart review, patient recently diagnosed with lung cancer and is followed at [facility] by Dr. Dempsey.  Patient denies chest pain, shortness of breath, nausea, vomiting, urinary symptoms, fever and chills.    ED workup significant for sodium 122, COVID and negative, CBC unremarkable.  Patient received 1 L IV LR in the ED and was referred to Hospital Medicine.  Hospital medicine discussed with Nephrology on-call, Dr. Trujillo, and recommendations appreciated.  Patient will be admitted for further evaluation management"    Overview/Hospital Course:  Patient is 64-year-old man with history of bipolar disorder/schizoaffective disorder, seizure disorder, significant tobacco smoking history who was recently diagnosed with invasive pulmonary adenocarcinoma based on bronchoscopy with biopsy of left lower lobe lung mass performed on 6/13/2024 who presents to the hospital new onset of bladder and bowel incontinence for the last 3 days.  Patient also reports increased fatigue, recent onset of lower extremity pain, and difficulty walking. Per sister patient is not able to care for himself due to his mental illness.  He lives with his sister who is also his caregiver.    He sees a physiatrist at [facility].  He has history of " difficult to control seizures but sister reports that seizures have been well controlled carbamazepine.  He has not had a seizure for the last 3 years.    Laboratory studies reveal hyponatremia with serum sodium 122 mmol/L.    Recent MRI of the brain performed on 8/2/2024 1.8 cm mass within the sella/suprasellar cistern with characteristics compatible with a cystic craniopharyngioma, similar when compared to CT head 2007.      PET scan from skull base to mid thigh performed on 7/11/2024 reveals left lower lobe nodular density consistent with neoplastic process.  PET scan also evidence of increased uptake in mediastinal lymph nodes, borderline increased uptake in the axillary lymph nodes, focal increased uptake in the left posterior pharyngeal wall, patchy increased uptake in the esophagus, multiple focal increase areas along the ascending colon.    Patient has not started any treatment for his recently diagnosed lung cancer yet.    Interval History: Serum sodium slowly trending up.    Review of Systems   Constitutional:  Positive for fatigue. Negative for chills and fever.   Respiratory:  Negative for shortness of breath.    Cardiovascular:  Negative for chest pain.   Gastrointestinal:  Negative for abdominal pain, constipation, diarrhea, nausea and vomiting.   Neurological:  Positive for weakness.     Objective:     Vital Signs (Most Recent):  Temp: 98.4 °F (36.9 °C) (08/11/24 0601)  Pulse: 65 (08/11/24 0605)  Resp: 19 (08/11/24 0605)  BP: (!) 147/80 (08/11/24 0605)  SpO2: 100 % (08/11/24 0605) Vital Signs (24h Range):  Temp:  [97.7 °F (36.5 °C)-98.4 °F (36.9 °C)] 98.4 °F (36.9 °C)  Pulse:  [59-86] 65  Resp:  [16-26] 19  SpO2:  [90 %-100 %] 100 %  BP: (122-172)/() 147/80     Weight: 49.9 kg (110 lb 0.2 oz)  Body mass index is 17.76 kg/m².    Intake/Output Summary (Last 24 hours) at 8/11/2024 0741  Last data filed at 8/11/2024 0613  Gross per 24 hour   Intake 2141 ml   Output 1110 ml   Net 1031 ml          Physical Exam  Constitutional:       Appearance: He is cachectic. He is ill-appearing.   Cardiovascular:      Rate and Rhythm: Normal rate and regular rhythm.      Heart sounds: Normal heart sounds. No murmur heard.     No friction rub. No gallop.   Pulmonary:      Effort: Pulmonary effort is normal. No respiratory distress.      Breath sounds: Normal breath sounds. No wheezing or rales.   Abdominal:      General: Bowel sounds are normal. There is no distension.      Palpations: Abdomen is soft.      Tenderness: There is no abdominal tenderness.   Musculoskeletal:         General: No tenderness. Normal range of motion.   Skin:     General: Skin is warm and dry.      Coloration: Skin is not pale.      Findings: No erythema or rash.   Neurological:      Mental Status: He is alert.      Comments: Patient mentally challenged at baseline.  Patient's sister reports that his mental status is at baseline.  Patient moving all his extremities (5/5) however reports some numbness in his right lower extremity/ankle.             Significant Labs: All pertinent labs within the past 24 hours have been reviewed.    Significant Imaging: I have reviewed all pertinent imaging results/findings within the past 24 hours.    Assessment/Plan:      * Hyponatremia  Possible side effect of seizure medication versus SIADH related to malignancy.  Initial clinical impression concerning for possible hypovolemia. Patient given isotonic intravenous fluids in the emergency department  with improvement serum sodium (125 to 128 mmol/L).  Serum osmolality of 256 mOsm/kg and urine osmolarity 248 mOsm/kg.  TSH pending.  Nephrology consult recommended free water restriction.  Will rule monitor response to free water restriction.    Seizure disorder  Sister reports he to control seizures until switch to carbamazepine 200 mg in the morning and 400 mg at night.  Serum sodium proving.  Continue home seizure medication for now.  May need to consider  alternative therapy if hyponatremia persists as carbamazepine can contribute to hyponatremia.    Primary cancer of left lower lobe of lung  New onset weakness and incontinence.  PET scan suggests evidence of likely metastatic disease.  Check MRI of the spine to rule out spinal involvement.    Bipolar disorder  Mental status at baseline per sister/caregiver.  Continue home regimen for now.      VTE Risk Mitigation (From admission, onward)           Ordered     enoxaparin injection 40 mg  Daily         08/11/24 0753     IP VTE HIGH RISK PATIENT  Once         08/11/24 0753     Place sequential compression device  Until discontinued         08/10/24 3161                    Dylon Figueroa MD  Department of Hospital Medicine   [facility] - Intensive Care ([place])

## 2024-08-11 NOTE — HOSPITAL COURSE
Patient is 64-year-old man with history of bipolar disorder/schizoaffective disorder, seizure disorder, significant tobacco smoking history who was recently diagnosed with invasive pulmonary adenocarcinoma based on bronchoscopy with biopsy of left lower lobe lung mass performed on 6/13/2024 admitted to the hospital for evaluation treatment of hyponatremia.  Laboratory studies reveal hyponatremia with serum sodium 122 mmol/L.  Also concern for possible spinal cord compression light of his cancer with  evidence of mental status case based on his PET scan.  MRI of the spine did not reveal evidence spinal cord compression.  Suspect difficulty with ambulation likely due to symptomatic hyponatremia.      Hyponatremia appears to be due to hypovolemic hyponatremia.    Patient at risk for possible SIADH however laboratory studies studies not consistent with SIADH.    Patient volume resuscitated with intravenous fluids.  Serum sodium corrected to within reasonable range. Also considered whether his home seizure medication carbamazepine may have caused his hyponatremia.  This seems less likely as it corrected with volume resuscitation.   Caregiver reports that his seizures have been difficult control without carbamazepine. Nephrology service consult recommended limit his free water intake to 500 cc a day but otherwise patient allow to take in fluids with solute and okay to continue carbamazepine.  Patient now ambulatory without assistance.    Patient with some cognitive impairment at baseline.   He never graduated from high school and also has some significant mental illness at baseline.   Sister who is his current caregiver says he is at his baseline in terms of his mental status.    Mental illness seems well controlled with his home regimen. Patient is stable to discharge home with family with close outpatient follow up with his cancer team to avoid delay in treating his malignancy. Recommend repeat laboratory studies in  about a week ensure as hyponatremia is stable/resolved.

## 2024-08-11 NOTE — SUBJECTIVE & OBJECTIVE
Interval History: Serum sodium slowly trending up.    Review of Systems   Constitutional:  Positive for fatigue. Negative for chills and fever.   Respiratory:  Negative for shortness of breath.    Cardiovascular:  Negative for chest pain.   Gastrointestinal:  Negative for abdominal pain, constipation, diarrhea, nausea and vomiting.   Neurological:  Positive for weakness.     Objective:     Vital Signs (Most Recent):  Temp: 98.4 °F (36.9 °C) (08/11/24 0601)  Pulse: 65 (08/11/24 0605)  Resp: 19 (08/11/24 0605)  BP: (!) 147/80 (08/11/24 0605)  SpO2: 100 % (08/11/24 0605) Vital Signs (24h Range):  Temp:  [97.7 °F (36.5 °C)-98.4 °F (36.9 °C)] 98.4 °F (36.9 °C)  Pulse:  [59-86] 65  Resp:  [16-26] 19  SpO2:  [90 %-100 %] 100 %  BP: (122-172)/() 147/80     Weight: 49.9 kg (110 lb 0.2 oz)  Body mass index is 17.76 kg/m².    Intake/Output Summary (Last 24 hours) at 8/11/2024 0741  Last data filed at 8/11/2024 0613  Gross per 24 hour   Intake 2141 ml   Output 1110 ml   Net 1031 ml         Physical Exam  Constitutional:       Appearance: He is cachectic. He is ill-appearing.   Cardiovascular:      Rate and Rhythm: Normal rate and regular rhythm.      Heart sounds: Normal heart sounds. No murmur heard.     No friction rub. No gallop.   Pulmonary:      Effort: Pulmonary effort is normal. No respiratory distress.      Breath sounds: Normal breath sounds. No wheezing or rales.   Abdominal:      General: Bowel sounds are normal. There is no distension.      Palpations: Abdomen is soft.      Tenderness: There is no abdominal tenderness.   Musculoskeletal:         General: No tenderness. Normal range of motion.   Skin:     General: Skin is warm and dry.      Coloration: Skin is not pale.      Findings: No erythema or rash.   Neurological:      Mental Status: He is alert.      Comments: Patient mentally challenged at baseline.  Patient's sister reports that his mental status is at baseline.  Patient moving all his extremities  (5/5) however reports some numbness in his right lower extremity/ankle.             Significant Labs: All pertinent labs within the past 24 hours have been reviewed.    Significant Imaging: I have reviewed all pertinent imaging results/findings within the past 24 hours.

## 2024-08-11 NOTE — ASSESSMENT & PLAN NOTE
History noted.  Per patient's sister he has not had any recent seizures and it has been well controlled with Tegretol    -okay to continue Tegretol given hyponatremia per Nephrology.  -seizure precautions

## 2024-08-11 NOTE — ASSESSMENT & PLAN NOTE
New onset weakness and incontinence.  PET scan suggests evidence of likely metastatic disease.  Check MRI of the spine to rule out spinal involvement.

## 2024-08-11 NOTE — PROGRESS NOTES
63 yo male active smoker w/ PMHx Bipolar ds, Schizo affective ds here w/ AMS now noted to be hyponatremia.   Recently diagnosed to have squamous cell lung Ca on therapy.     S/p d/w Nephro in the ED, pt is placed on free water restriction for slow improvement in Na level.   SIADH suspected.     Detailed H&P noted in the chart.   Please call us for further assistance.     Can consider nicotine patch for withdrawal symptoms.

## 2024-08-11 NOTE — PROVIDER PROGRESS NOTES - EMERGENCY DEPT.
Encounter Date: 8/10/2024    ED Physician Progress Notes        Physician Note:   18:15:  Initial EKG read STEMI for lateral ST elevations however patient has diffuse baseline artifact  Patient evaluated in triage, denies chest pain.  He is here for foot pain and a tremor history of seizures.    Repeat EKG performed at 6:22 p.m., normal sinus rhythm at 76 with right atrial enlargement with no acute ischemic changes.  No code STEMI activation.    AUSTIN Membreno MD  Staff ED Physician  08/10/2024 6:30 PM

## 2024-08-11 NOTE — ASSESSMENT & PLAN NOTE
Patient presenting with sodium 122, prior sodium 127 on 03/2024.      Discussed with Nephrology on-call, , and patient placed on free water restriction. OK to give home tegretol per Nephro.     - Nephrology to consult, recommendations appreciated  - Correct the sodium by 4-6mEq in 24 hours.   - Obtain the following studies: Urine sodium, urine osmolality, serum osmolality.  - Will treat the hyponatremia with free water restriction  - Monitor sodium Every 6 hours.

## 2024-08-11 NOTE — CONSULTS
Consult Note  Nephrology    Consult Requested By: Dylon Figueroa MD  Reason for Consult: Hyponatremia    SUBJECTIVE:     History of Present Illness:  Patient is a 64 y.o. male with past medical history of bipolar disorder, seizures and LLL squamous cell carcinoma who presented to [facility] [place] ER with fatigue, weakness and urinary incontinence x 3 days. Found to have Na of 122 on presentation. Discussed with hospital medicine on call on presentation and had decided to free water restrict on admission and Na slowly improving. Patient difficult to understand but awake and interactive.    Past Medical History:   Diagnosis Date    Bipolar disorder     Schizoaffective disorder      History reviewed. No pertinent surgical history.  Family History   Problem Relation Name Age of Onset    Heart disease Father      Heart attack Father      Diabetes Sister      Seizures Sister      Hypertension Brother      Diabetes Brother      Asthma Mother       Social History     Tobacco Use    Smoking status: Every Day     Current packs/day: 1.00     Types: Cigarettes   Substance Use Topics    Alcohol use: No    Drug use: No       Review of patient's allergies indicates:  No Known Allergies     Review of Systems:  Constitutional: No fever or chills  Respiratory: No cough or shortness of breath  Cardiovascular: No chest pain or palpitations  Gastrointestinal: No nausea or vomiting  Neurological: No confusion or weakness    OBJECTIVE:     Vital Signs (Most Recent)  Temp: 97.4 °F (36.3 °C) (08/11/24 1905)  Pulse: 76 (08/11/24 2013)  Resp: 20 (08/11/24 2013)  BP: (!) 142/81 (08/11/24 1905)  SpO2: 100 % (08/11/24 2013)    Vital Signs Range (Last 24H):  Temp:  [97.4 °F (36.3 °C)-98.7 °F (37.1 °C)]   Pulse:  [59-78]   Resp:  [16-39]   BP: (122-172)/()   SpO2:  [90 %-100 %]       Intake/Output Summary (Last 24 hours) at 8/11/2024 2030  Last data filed at 8/11/2024 2020  Gross per 24 hour   Intake 3601 ml   Output 1685 ml   Net 1916 ml  "      Physical Exam:  General appearance: Well developed, well nourished  Eyes:  Conjunctivae/corneas clear. PERRL.  Lungs: Normal respiratory effort,   clear to auscultation bilaterally   Heart: Regular rate and rhythm, S1, S2 normal, no murmur, rub or argentina.  Abdomen: Soft, non-tender non-distended; bowel sounds normal; no masses,  no organomegaly  Extremities: No cyanosis or clubbing. No edema.    Skin: Skin color, texture, turgor normal. No rashes or lesions  Neurologic: Normal strength and tone. No focal numbness or weakness       Laboratory:  Recent Labs   Lab 08/11/24 0659   WBC 5.26   RBC 3.78*   HGB 11.8*   HCT 32.9*      MCV 87   MCH 31.2*   MCHC 35.9     BMP:   Recent Labs   Lab 08/11/24 0659 08/11/24  1141   GLU 93  --    *  128* 129*   K 3.9  --    CL 94*  --    CO2 26  --    BUN 9  --    CREATININE 0.9  --    CALCIUM 8.8  --      Lab Results   Component Value Date    CALCIUM 8.8 08/11/2024    PHOS 3.3 12/13/2007     BNP  No results for input(s): "BNP", "BNPTRIAGEBLO" in the last 168 hours.No results found for: "URICACID"No results found for: "IRON", "TIBC", "FERRITIN", "SATURATEDIRO"  Lab Results   Component Value Date    CALCIUM 8.8 08/11/2024    PHOS 3.3 12/13/2007       Diagnostic Results:  MRI Spine Cervical-Thoracic-Lumbar W W/O Contrast (XPD)   Final Result      Degenerative changes throughout the spine, as detailed above.  Findings most pronounced throughout the cervical spine with multilevel high-grade neural foraminal narrowing.  No spinal canal stenosis.      No abnormal enhancement identified.      Nodular lesion in the left lower lobe, likely related to reported lung malignancy.  Correlate with imaging from outside facility.      Electronically signed by resident: Phong Nolasco   Date:    08/11/2024   Time:    11:15      Electronically signed by: Rayshawn Ramirez MD   Date:    08/11/2024   Time:    11:47      X-Ray Chest AP Portable   Final Result      No acute cardiopulmonary " process identified.         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 D/w RN at bedside. Goal 127-128 by 6 PM    Seizure disorder  -Patient on carbamazepine at home which can contribute to hyponatremia as above  -So far hypoNa improving with free water restriction so ok to continue    Squamous cell carcinoma of LLL  -Recently diagnosed  -Follows with [facility] oncology    Bipolar disorder  -continue home regimen    Thanks for the consult. We will follow along closely.

## 2024-08-12 VITALS
TEMPERATURE: 98 F | HEIGHT: 66 IN | RESPIRATION RATE: 15 BRPM | WEIGHT: 110 LBS | DIASTOLIC BLOOD PRESSURE: 59 MMHG | BODY MASS INDEX: 17.68 KG/M2 | HEART RATE: 80 BPM | SYSTOLIC BLOOD PRESSURE: 125 MMHG | OXYGEN SATURATION: 98 %

## 2024-08-12 LAB
ANION GAP SERPL CALC-SCNC: 8 MMOL/L (ref 8–16)
BASOPHILS # BLD AUTO: 0.02 K/UL (ref 0–0.2)
BASOPHILS NFR BLD: 0.4 % (ref 0–1.9)
BUN SERPL-MCNC: 8 MG/DL (ref 8–23)
CALCIUM SERPL-MCNC: 8.7 MG/DL (ref 8.7–10.5)
CHLORIDE SERPL-SCNC: 97 MMOL/L (ref 95–110)
CO2 SERPL-SCNC: 28 MMOL/L (ref 23–29)
CREAT SERPL-MCNC: 1 MG/DL (ref 0.5–1.4)
DIFFERENTIAL METHOD BLD: ABNORMAL
EOSINOPHIL # BLD AUTO: 0 K/UL (ref 0–0.5)
EOSINOPHIL NFR BLD: 0 % (ref 0–8)
ERYTHROCYTE [DISTWIDTH] IN BLOOD BY AUTOMATED COUNT: 13 % (ref 11.5–14.5)
EST. GFR  (NO RACE VARIABLE): >60 ML/MIN/1.73 M^2
GLUCOSE SERPL-MCNC: 95 MG/DL (ref 70–110)
HCT VFR BLD AUTO: 34.6 % (ref 40–54)
HGB BLD-MCNC: 11.9 G/DL (ref 14–18)
IMM GRANULOCYTES # BLD AUTO: 0.02 K/UL (ref 0–0.04)
IMM GRANULOCYTES NFR BLD AUTO: 0.4 % (ref 0–0.5)
LYMPHOCYTES # BLD AUTO: 2 K/UL (ref 1–4.8)
LYMPHOCYTES NFR BLD: 36.9 % (ref 18–48)
MCH RBC QN AUTO: 30.8 PG (ref 27–31)
MCHC RBC AUTO-ENTMCNC: 34.4 G/DL (ref 32–36)
MCV RBC AUTO: 90 FL (ref 82–98)
MONOCYTES # BLD AUTO: 0.6 K/UL (ref 0.3–1)
MONOCYTES NFR BLD: 11.8 % (ref 4–15)
NEUTROPHILS # BLD AUTO: 2.7 K/UL (ref 1.8–7.7)
NEUTROPHILS NFR BLD: 50.5 % (ref 38–73)
NRBC BLD-RTO: 0 /100 WBC
OSMOLALITY SERPL: 278 MOSM/KG (ref 280–300)
PLATELET # BLD AUTO: 173 K/UL (ref 150–450)
PMV BLD AUTO: 9.5 FL (ref 9.2–12.9)
POTASSIUM SERPL-SCNC: 4.1 MMOL/L (ref 3.5–5.1)
RBC # BLD AUTO: 3.86 M/UL (ref 4.6–6.2)
SODIUM SERPL-SCNC: 133 MMOL/L (ref 136–145)
SODIUM SERPL-SCNC: 133 MMOL/L (ref 136–145)
WBC # BLD AUTO: 5.42 K/UL (ref 3.9–12.7)

## 2024-08-12 PROCEDURE — 25000242 PHARM REV CODE 250 ALT 637 W/ HCPCS: Performed by: NURSE PRACTITIONER

## 2024-08-12 PROCEDURE — 80048 BASIC METABOLIC PNL TOTAL CA: CPT | Performed by: NURSE PRACTITIONER

## 2024-08-12 PROCEDURE — 36415 COLL VENOUS BLD VENIPUNCTURE: CPT | Performed by: NURSE PRACTITIONER

## 2024-08-12 PROCEDURE — 83930 ASSAY OF BLOOD OSMOLALITY: CPT | Performed by: HOSPITALIST

## 2024-08-12 PROCEDURE — 84295 ASSAY OF SERUM SODIUM: CPT | Performed by: NURSE PRACTITIONER

## 2024-08-12 PROCEDURE — 25000003 PHARM REV CODE 250: Performed by: HOSPITALIST

## 2024-08-12 PROCEDURE — 85025 COMPLETE CBC W/AUTO DIFF WBC: CPT | Performed by: NURSE PRACTITIONER

## 2024-08-12 PROCEDURE — 94761 N-INVAS EAR/PLS OXIMETRY MLT: CPT

## 2024-08-12 PROCEDURE — 25000003 PHARM REV CODE 250: Performed by: NURSE PRACTITIONER

## 2024-08-12 RX ORDER — ACETAMINOPHEN 325 MG/1
650 TABLET ORAL EVERY 8 HOURS PRN
COMMUNITY
Start: 2024-08-12

## 2024-08-12 RX ORDER — CARBAMAZEPINE 200 MG/1
400 TABLET ORAL NIGHTLY
Start: 2024-08-12

## 2024-08-12 RX ORDER — CARBAMAZEPINE 200 MG/1
200 TABLET ORAL EVERY MORNING
Start: 2024-08-12

## 2024-08-12 RX ADMIN — ALBUTEROL SULFATE 2 PUFF: 90 AEROSOL, METERED RESPIRATORY (INHALATION) at 10:08

## 2024-08-12 RX ADMIN — RISPERIDONE 3 MG: 1 TABLET, FILM COATED ORAL at 10:08

## 2024-08-12 RX ADMIN — CARBAMAZEPINE 200 MG: 200 TABLET ORAL at 08:08

## 2024-08-12 RX ADMIN — BENZTROPINE MESYLATE 1 MG: 1 TABLET ORAL at 10:08

## 2024-08-12 NOTE — PT/OT/SLP PROGRESS
Physical Therapy  Not Seen    Patient Name:  Bulmaro Tabares   MRN:  [id number]    Patient not seen today secondary to Patient discharged prior to initiation of evaluation.

## 2024-08-12 NOTE — PROGRESS NOTES
"Nephrology  Progress Note    Admit Date: 8/10/2024   LOS: 2 days     SUBJECTIVE:     Follow-up For:  Hyponatremia    History of Present Illness:  Patient is a 64 y.o. male with past medical history of bipolar disorder, seizures and LLL squamous cell carcinoma who presented to [facility] ER with fatigue, weakness and urinary incontinence x 3 days. Found to have Na of 122 on presentation. Discussed with hospital medicine on call on presentation and had decided to free water restrict on admission and Na slowly improving. Patient difficult to understand but awake and interactive.    Interval History:     Trends improving.  Walking around room and now eating breakfast.  Discussed with pt/team at bedside.  Labs stable.  Explained water restriction.  No c/o.     Review of Systems:  Constitutional: No fever or chills  Respiratory: No cough or shortness of breath  Cardiovascular: No chest pain or palpitations  Gastrointestinal: No nausea or vomiting  Neurological: No confusion or weakness    OBJECTIVE:     Vital Signs Range (Last 24H):  BP (!) 109/59   Pulse 62   Temp 97.6 °F (36.4 °C) (Oral)   Resp 16   Ht 5' 6" (1.676 m)   Wt 49.9 kg (110 lb 0.2 oz)   SpO2 97%   BMI 17.76 kg/m²     Temp:  [97.4 °F (36.3 °C)-98.7 °F (37.1 °C)]   Pulse:  [59-76]   Resp:  [13-39]   BP: (109-143)/(59-98)   SpO2:  [96 %-100 %]     I & O (Last 24H):  Intake/Output Summary (Last 24 hours) at 8/12/2024 0802  Last data filed at 8/11/2024 2057  Gross per 24 hour   Intake 1040 ml   Output 875 ml   Net 165 ml       Physical Exam:  General appearance: frail/malnourished.   Eyes:  Conjunctivae/corneas clear. PERRL.  Lungs: Normal respiratory effort,   clear to auscultation bilaterally   Heart: Regular rate and rhythm, S1, S2 normal, no murmur, rub or argentina.  Abdomen: Soft, non-tender non-distended; bowel sounds normal; no masses,  no organomegaly  Extremities: No cyanosis or clubbing. No edema.    Skin: Skin color, texture, turgor normal. No rashes " "or lesions  Neurologic: Normal strength and tone. No focal numbness or weakness     Laboratory Data:  Recent Labs   Lab 08/12/24  0400   WBC 5.42   RBC 3.86*   HGB 11.9*   HCT 34.6*      MCV 90   MCH 30.8   MCHC 34.4       BMP:   Recent Labs   Lab 08/12/24  0400   GLU 95   *  133*   K 4.1   CL 97   CO2 28   BUN 8   CREATININE 1.0   CALCIUM 8.7     Lab Results   Component Value Date    CALCIUM 8.7 08/12/2024    PHOS 3.3 12/13/2007       Lab Results   Component Value Date    CALCIUM 8.7 08/12/2024    PHOS 3.3 12/13/2007       No results found for: "URICACID"    BNP  No results for input(s): "BNP", "BNPTRIAGEBLO" in the last 168 hours.    Medications:  Medication list was reviewed and changes noted under Assessment/Plan.    Diagnostic Results:    reviewed    ASSESSMENT/PLAN:     Hyponatremia  -Favor SIADH 2/2 lung cancer history but Urine osm not consistent  -Also takes Carbamazepine which is known to be associated with SIADH/hyponatremia  -Given 500 mL of LR in ER on presentation  -Initial Na 122  -Had free water restricted and Na improved 122 to 129   -monitored trends and doing well.    -restricting free water to 500 cc/day but drink other substances.    -BMP later this week.        Seizure disorder  -Patient on carbamazepine at home which can contribute to hyponatremia as above  -So far hypoNa improving with free water restriction so ok to continue       Squamous cell carcinoma of LLL  -Recently diagnosed  -Follows with [facility] oncology       Bipolar disorder  -continue home regimen      As above  Ok to DC  BMP later this week.        "

## 2024-08-12 NOTE — PLAN OF CARE
[facility] - [facility] ([place])  Discharge Final Note    Primary Care Provider: No, Primary Doctor    Expected Discharge Date: 8/12/2024    Final Discharge Note (most recent)       Final Note - 08/12/24 0936          Final Note    Assessment Type Final Discharge Note (P)      Anticipated Discharge Disposition Home or Self Care (P)      Hospital Resources/Appts/Education Provided Provided patient/caregiver with written discharge plan information;Appointments scheduled and added to AVS (P)         Post-Acute Status    Discharge Delays None known at this time (P)                      Important Message from Medicare             Contact Info       Carlota Whitlock MD   Specialty: Pulmonary Disease    [address] [address]   Phone: [phone]       Next Steps: Follow up          Patient will discharge home with family.     Family/sister (Stacia) will provide transportation home.    Stacia will make patient hospital follow up with his PCP.     No DC needs from CM perspective.

## 2024-08-12 NOTE — DISCHARGE SUMMARY
"[facility] Intensive Care [facility] Medicine  Discharge Summary      Patient Name: Bulmaro Tabares  MRN: [id number]  HOLLY: [id number]  Patient Class: IP- Inpatient  Admission Date: 8/10/2024  Hospital Length of Stay: 2 days  Discharge Date and Time: 8/12/2024 10:45 AM  Attending Physician: Dylon Figueroa MD  Discharging Provider: Dylon Figueroa MD      HPI:   Per Val Askew, NP:    "Bulmaro Tabares is a 64-year-old male with a past medical history of bipolar disorder, seizures and recently diagnosed left lower lobe squamous cell carcinoma (06/2024) who presents with increased fatigue, weakness and urinary incontinence for the past 3 days.  Patient's sister helps with caregiving and states patient is not incontinent at baseline.  Per chart review, patient recently diagnosed with lung cancer and is followed at [facility] by Dr. Dempsey.  Patient denies chest pain, shortness of breath, nausea, vomiting, urinary symptoms, fever and chills.    ED workup significant for sodium 122, COVID and negative, CBC unremarkable.  Patient received 1 L IV LR in the ED and was referred to Hospital Medicine.  Hospital medicine discussed with Nephrology on-call, Dr. Trujillo, and recommendations appreciated.  Patient will be admitted for further evaluation management"    Hospital Course:   Patient is 64-year-old man with history of bipolar disorder/schizoaffective disorder, seizure disorder, significant tobacco smoking history who was recently diagnosed with invasive pulmonary adenocarcinoma based on bronchoscopy with biopsy of left lower lobe lung mass performed on 6/13/2024 admitted to the hospital for evaluation treatment of hyponatremia.  Laboratory studies reveal hyponatremia with serum sodium 122 mmol/L.  Also concern for possible spinal cord compression light of his cancer with  evidence of mental status case based on his PET scan.  MRI of the spine did not reveal evidence spinal cord compression.  Suspect difficulty with " ambulation likely due to symptomatic hyponatremia.      Hyponatremia appears to be due to hypovolemic hyponatremia.    Patient at risk for possible SIADH however laboratory studies studies not consistent with SIADH.    Patient volume resuscitated with intravenous fluids.  Serum sodium corrected to within reasonable range. Also considered whether his home seizure medication carbamazepine may have caused his hyponatremia.  This seems less likely as it corrected with volume resuscitation.   Caregiver reports that his seizures have been difficult control without carbamazepine. Nephrology service consult recommended limit his free water intake to 500 cc a day but otherwise patient allow to take in fluids with solute and okay to continue carbamazepine.  Patient now ambulatory without assistance.    Patient with some cognitive impairment at baseline.   He never graduated from high school and also has some significant mental illness at baseline.   Sister who is his current caregiver says he is at his baseline in terms of his mental status.    Mental illness seems well controlled with his home regimen. Patient is stable to discharge home with family with close outpatient follow up with his cancer team to avoid delay in treating his malignancy. Recommend repeat laboratory studies in about a week ensure as hyponatremia is stable/resolved.     Goals of Care Treatment Preferences:  Code Status: Full Code         Consults:   Consults (From admission, onward)          Status Ordering Provider     Inpatient consult to Nephrology  Once        Provider:  Mitzi Trujillo MD    Completed MARGARET PARRA            Final Active Diagnoses:    Diagnosis Date Noted POA    PRINCIPAL PROBLEM:  Hyponatremia [E87.1] 08/10/2024 Yes    Seizure disorder [G40.909] 06/15/2015 Yes    Primary cancer of left lower lobe of lung [C34.32] 06/26/2024 Yes    Bipolar disorder [F31.9] 06/15/2015 Yes      Problems Resolved During this Admission:        Discharged Condition: Stable    Disposition: Home or Self Care    Follow Up:   Follow-up Information       Carlota Whitlock MD Follow up.    Specialty: Pulmonary Disease  Contact information:  Dave Cordova  [facility] [address] [place] [id number]  [phone]                           Patient Instructions:      Diet Adult Regular     Notify your health care provider if you experience any of the following:  temperature >100.4     Notify your health care provider if you experience any of the following:  persistent nausea and vomiting or diarrhea     Notify your health care provider if you experience any of the following:  severe uncontrolled pain     Notify your health care provider if you experience any of the following:  difficulty breathing or increased cough     Notify your health care provider if you experience any of the following:  severe persistent headache     Notify your health care provider if you experience any of the following:  worsening rash     Notify your health care provider if you experience any of the following:  persistent dizziness, light-headedness, or visual disturbances     Notify your health care provider if you experience any of the following:  increased confusion or weakness     Activity as tolerated          Medications:  Reconciled Home Medications:      Medication List        START taking these medications      acetaminophen 325 MG tablet  Commonly known as: TYLENOL  Take 2 tablets (650 mg total) by mouth every 8 (eight) hours as needed for Pain.            CHANGE how you take these medications      benztropine 1 MG tablet  Commonly known as: COGENTIN  Take 1 tablet by mouth 2 (two) times daily.  What changed: Another medication with the same name was removed. Continue taking this medication, and follow the directions you see here.     * carBAMazepine 200 mg tablet  Commonly known as: TEGRETOL  Take 1 tablet (200 mg total) by mouth every morning.  What changed:   how much to take  how to  take this  when to take this  additional instructions     * carBAMazepine 200 mg tablet  Commonly known as: TEGRETOL  Take 2 tablets (400 mg total) by mouth every evening.  What changed: You were already taking a medication with the same name, and this prescription was added. Make sure you understand how and when to take each.     risperiDONE 3 MG Tab  Commonly known as: RISPERDAL  Take 1 tablet by mouth 2 (two) times daily.  What changed: Another medication with the same name was removed. Continue taking this medication, and follow the directions you see here.           * This list has 2 medication(s) that are the same as other medications prescribed for you. Read the directions carefully, and ask your doctor or other care provider to review them with you.                CONTINUE taking these medications      albuterol 90 mcg/actuation inhaler  Commonly known as: PROVENTIL/VENTOLIN HFA  Inhale 2 puffs into the lungs every 4 (four) hours as needed for Shortness of Breath.     ANORO ELLIPTA 62.5-25 mcg/actuation Dsdv  Generic drug: umeclidinium-vilanteroL  Inhale 1 puff into the lungs once daily.     aspirin 81 MG EC tablet  Commonly known as: ECOTRIN  Take 1 tablet (81 mg total) by mouth once daily.     atorvastatin 40 MG tablet  Commonly known as: LIPITOR  Take 1 tablet (40 mg total) by mouth once daily.            STOP taking these medications      vitamin D 1000 units Tab  Commonly known as: VITAMIN D3              Indwelling Lines/Drains at time of discharge:   Lines/Drains/Airways       None                   Time spent on the discharge of patient: 35 minutes         Dylon Figueroa MD  Department of Hospital Medicine  [facility] Intensive [facility])

## 2024-08-12 NOTE — DISCHARGE INSTRUCTIONS
Discharge instructions reviewed with patient and patient's family members, patient's family member verbalized understanding of discharge instructions.

## 2024-08-12 NOTE — PLAN OF CARE
The following information below was gathered from the patient sister & niece at bedside.         [facility] - Intensive Care ([place])  Initial Discharge Assessment       Primary Care Provider: No, Primary Doctor    Admission Diagnosis: Hyponatremia [E87.1]  SOB (shortness of breath) [R06.02]  Generalized weakness [R53.1]  Chest pain [R07.9]    Admission Date: 8/10/2024  Expected Discharge Date: 8/12/2024    Transition of Care Barriers: (P) None    Payor: AET[employer] MANAGED MEDICARE / Plan: AETNA MEDICARE DUAL DSNP / Product Type: Medicare Advantage /     Extended Emergency Contact Information  Primary Emergency Contact: Stacia Tabares  Address: [facility] [place] [address] [facility]  Home Phone: [phone]  Relation: Sister  Secondary Emergency Contact: Gail Tabares   [place]  Mobile Phone: [phone]  Relation: Relative    Discharge Plan A: (P) Home with family  Discharge Plan B: (P) Home Health, Home with family      [id number] [place], [place] - [address]  [address]  [facility] [address]  Phone: [phone] Fax: [phone]      Initial Assessment (most recent)       Adult Discharge Assessment - 08/12/24 0910          Discharge Assessment    Assessment Type Discharge Planning Assessment (P)      Confirmed/corrected address, phone number and insurance Yes (P)      Confirmed Demographics Correct on Facesheet (P)      Source of Information family;health record (P)      People in Home sibling(s);other relative(s) (P)      Do you expect to return to your current living situation? Yes (P)      Do you have help at home or someone to help you manage your care at home? Yes (P)      Prior to hospitilization cognitive status: Alert/Oriented (P)      Current cognitive status: Alert/Oriented (P)      Walking or Climbing Stairs Difficulty no (P)      Dressing/Bathing Difficulty no  (P)      Equipment Currently Used at Home none (P)      Readmission within 30 days? No (P)      Patient currently being followed by outpatient case management? No (P)      Do you currently have service(s) that help you manage your care at home? No (P)      Do you take prescription medications? Yes (P)      Do you have prescription coverage? Yes (P)      Do you have any problems affording any of your prescribed medications? No (P)      Is the patient taking medications as prescribed? yes (P)      How do you get to doctors appointments? car, drives self;family or friend will provide (P)      Are you on dialysis? No (P)      Do you take coumadin? No (P)      Discharge Plan A Home with family (P)      Discharge Plan B Home Health;Home with family (P)      DME Needed Upon Discharge  none (P)      Discharge Plan discussed with: Patient;Sibling (P)      Transition of Care Barriers None (P)         Physical Activity    On average, how many days per week do you engage in moderate to strenuous exercise (like a brisk walk)? Patient unable to answer (P)      On average, how many minutes do you engage in exercise at this level? Patient unable to answer (P)         Financial Resource Strain    How hard is it for you to pay for the very basics like food, housing, medical care, and heating? Patient unable to answer (P)         Housing Stability    In the last 12 months, was there a time when you were not able to pay the mortgage or rent on time? Patient unable to answer (P)         Transportation Needs    Has the lack of transportation kept you from medical appointments, meetings, work or from getting things needed for daily living? Patient unable to answer (P)         Food Insecurity    Within the past 12 months, you worried that your food would run out before you got the money to buy more. Patient unable to answer (P)      Within the past 12 months, the food you bought just didn't last and you didn't have money to get more.  Patient unable to answer (P)         Stress    Do you feel stress - tense, restless, nervous, or anxious, or unable to sleep at night because your mind is troubled all the time - these days? Patient unable to answer (P)         Social Isolation    How often do you feel lonely or isolated from those around you?  Patient unable to answer (P)         Alcohol Use    Q1: How often do you have a drink containing alcohol? Patient unable to answer (P)      Q2: How many drinks containing alcohol do you have on a typical day when you are drinking? Patient unable to answer (P)      Q3: How often do you have six or more drinks on one occasion? Patient unable to answer (P)         Utilities    In the past 12 months has the electric, gas, oil, or water company threatened to shut off services in your home? Patient unable to answer (P)         Health Literacy    How often do you need to have someone help you when you read instructions, pamphlets, or other written material from your doctor or pharmacy? Patient unable to respond (P)

## 2024-08-23 ENCOUNTER — HOSPITAL ENCOUNTER (INPATIENT)
Facility: OTHER | Age: 64
LOS: 1 days | Discharge: HOME | DRG: 644 | End: 2024-08-25
Attending: EMERGENCY MEDICINE | Admitting: HOSPITALIST
Payer: MEDICARE

## 2024-08-23 DIAGNOSIS — R07.9 CHEST PAIN: ICD-10-CM

## 2024-08-23 DIAGNOSIS — C34.32 PRIMARY CANCER OF LEFT LOWER LOBE OF LUNG: ICD-10-CM

## 2024-08-23 DIAGNOSIS — E87.1 HYPONATREMIA: Primary | ICD-10-CM

## 2024-08-23 LAB
ALBUMIN SERPL BCP-MCNC: 3.8 G/DL (ref 3.5–5.2)
ALP SERPL-CCNC: 112 U/L (ref 55–135)
ALT SERPL W/O P-5'-P-CCNC: 11 U/L (ref 10–44)
ANION GAP SERPL CALC-SCNC: 12 MMOL/L (ref 8–16)
ANION GAP SERPL CALC-SCNC: 8 MMOL/L (ref 8–16)
AST SERPL-CCNC: 28 U/L (ref 10–40)
BASOPHILS # BLD AUTO: 0.02 K/UL (ref 0–0.2)
BASOPHILS NFR BLD: 0.4 % (ref 0–1.9)
BILIRUB SERPL-MCNC: 0.2 MG/DL (ref 0.1–1)
BILIRUB UR QL STRIP: NEGATIVE
BUN SERPL-MCNC: 8 MG/DL (ref 8–23)
BUN SERPL-MCNC: 9 MG/DL (ref 8–23)
CALCIUM SERPL-MCNC: 8.9 MG/DL (ref 8.7–10.5)
CALCIUM SERPL-MCNC: 9 MG/DL (ref 8.7–10.5)
CHLORIDE SERPL-SCNC: 90 MMOL/L (ref 95–110)
CHLORIDE SERPL-SCNC: 91 MMOL/L (ref 95–110)
CLARITY UR: CLEAR
CO2 SERPL-SCNC: 22 MMOL/L (ref 23–29)
CO2 SERPL-SCNC: 26 MMOL/L (ref 23–29)
COLOR UR: YELLOW
CREAT SERPL-MCNC: 1 MG/DL (ref 0.5–1.4)
CREAT SERPL-MCNC: 1 MG/DL (ref 0.5–1.4)
CREAT UR-MCNC: 54 MG/DL (ref 23–375)
DIFFERENTIAL METHOD BLD: ABNORMAL
EOSINOPHIL # BLD AUTO: 0 K/UL (ref 0–0.5)
EOSINOPHIL NFR BLD: 0 % (ref 0–8)
ERYTHROCYTE [DISTWIDTH] IN BLOOD BY AUTOMATED COUNT: 13 % (ref 11.5–14.5)
EST. GFR  (NO RACE VARIABLE): >60 ML/MIN/1.73 M^2
EST. GFR  (NO RACE VARIABLE): >60 ML/MIN/1.73 M^2
GLUCOSE SERPL-MCNC: 152 MG/DL (ref 70–110)
GLUCOSE SERPL-MCNC: 90 MG/DL (ref 70–110)
GLUCOSE UR QL STRIP: NEGATIVE
HCT VFR BLD AUTO: 34 % (ref 40–54)
HGB BLD-MCNC: 12.4 G/DL (ref 14–18)
HGB UR QL STRIP: NEGATIVE
IMM GRANULOCYTES # BLD AUTO: 0.01 K/UL (ref 0–0.04)
IMM GRANULOCYTES NFR BLD AUTO: 0.2 % (ref 0–0.5)
KETONES UR QL STRIP: NEGATIVE
LEUKOCYTE ESTERASE UR QL STRIP: NEGATIVE
LYMPHOCYTES # BLD AUTO: 1.2 K/UL (ref 1–4.8)
LYMPHOCYTES NFR BLD: 22.9 % (ref 18–48)
MCH RBC QN AUTO: 32 PG (ref 27–31)
MCHC RBC AUTO-ENTMCNC: 36.5 G/DL (ref 32–36)
MCV RBC AUTO: 88 FL (ref 82–98)
MONOCYTES # BLD AUTO: 0.4 K/UL (ref 0.3–1)
MONOCYTES NFR BLD: 8.5 % (ref 4–15)
NEUTROPHILS # BLD AUTO: 3.5 K/UL (ref 1.8–7.7)
NEUTROPHILS NFR BLD: 68 % (ref 38–73)
NITRITE UR QL STRIP: NEGATIVE
NRBC BLD-RTO: 0 /100 WBC
OSMOLALITY UR: 333 MOSM/KG (ref 50–1200)
PH UR STRIP: 8 [PH] (ref 5–8)
PLATELET # BLD AUTO: 207 K/UL (ref 150–450)
PMV BLD AUTO: 8.8 FL (ref 9.2–12.9)
POTASSIUM SERPL-SCNC: 4.5 MMOL/L (ref 3.5–5.1)
POTASSIUM SERPL-SCNC: 4.8 MMOL/L (ref 3.5–5.1)
PROT SERPL-MCNC: 8 G/DL (ref 6–8.4)
PROT UR QL STRIP: NEGATIVE
RBC # BLD AUTO: 3.87 M/UL (ref 4.6–6.2)
SODIUM SERPL-SCNC: 124 MMOL/L (ref 136–145)
SODIUM SERPL-SCNC: 125 MMOL/L (ref 136–145)
SODIUM UR-SCNC: 83 MMOL/L (ref 20–250)
SP GR UR STRIP: 1.01 (ref 1–1.03)
URN SPEC COLLECT METH UR: NORMAL
UROBILINOGEN UR STRIP-ACNC: NEGATIVE EU/DL
WBC # BLD AUTO: 5.16 K/UL (ref 3.9–12.7)

## 2024-08-23 PROCEDURE — 81003 URINALYSIS AUTO W/O SCOPE: CPT | Performed by: EMERGENCY MEDICINE

## 2024-08-23 PROCEDURE — 80053 COMPREHEN METABOLIC PANEL: CPT | Performed by: NURSE PRACTITIONER

## 2024-08-23 PROCEDURE — 99285 EMERGENCY DEPT VISIT HI MDM: CPT | Mod: 25

## 2024-08-23 PROCEDURE — 84300 ASSAY OF URINE SODIUM: CPT | Performed by: EMERGENCY MEDICINE

## 2024-08-23 PROCEDURE — 96372 THER/PROPH/DIAG INJ SC/IM: CPT | Performed by: NURSE PRACTITIONER

## 2024-08-23 PROCEDURE — 36415 COLL VENOUS BLD VENIPUNCTURE: CPT | Performed by: NURSE PRACTITIONER

## 2024-08-23 PROCEDURE — 94761 N-INVAS EAR/PLS OXIMETRY MLT: CPT

## 2024-08-23 PROCEDURE — 83935 ASSAY OF URINE OSMOLALITY: CPT | Performed by: EMERGENCY MEDICINE

## 2024-08-23 PROCEDURE — G0378 HOSPITAL OBSERVATION PER HR: HCPCS

## 2024-08-23 PROCEDURE — 80048 BASIC METABOLIC PNL TOTAL CA: CPT | Mod: XB | Performed by: NURSE PRACTITIONER

## 2024-08-23 PROCEDURE — 25000003 PHARM REV CODE 250: Performed by: INTERNAL MEDICINE

## 2024-08-23 PROCEDURE — 96360 HYDRATION IV INFUSION INIT: CPT

## 2024-08-23 PROCEDURE — 82570 ASSAY OF URINE CREATININE: CPT | Performed by: EMERGENCY MEDICINE

## 2024-08-23 PROCEDURE — 96361 HYDRATE IV INFUSION ADD-ON: CPT

## 2024-08-23 PROCEDURE — 85025 COMPLETE CBC W/AUTO DIFF WBC: CPT | Performed by: NURSE PRACTITIONER

## 2024-08-23 PROCEDURE — 25000003 PHARM REV CODE 250: Performed by: NURSE PRACTITIONER

## 2024-08-23 PROCEDURE — 63600175 PHARM REV CODE 636 W HCPCS: Performed by: NURSE PRACTITIONER

## 2024-08-23 RX ORDER — CARBAMAZEPINE 200 MG/1
200 TABLET ORAL EVERY MORNING
Status: DISCONTINUED | OUTPATIENT
Start: 2024-08-24 | End: 2024-08-25 | Stop reason: HOSPADM

## 2024-08-23 RX ORDER — ENOXAPARIN SODIUM 100 MG/ML
40 INJECTION SUBCUTANEOUS EVERY 24 HOURS
Status: DISCONTINUED | OUTPATIENT
Start: 2024-08-23 | End: 2024-08-25 | Stop reason: HOSPADM

## 2024-08-23 RX ORDER — SODIUM CHLORIDE 9 MG/ML
INJECTION, SOLUTION INTRAVENOUS CONTINUOUS
Status: DISCONTINUED | OUTPATIENT
Start: 2024-08-23 | End: 2024-08-24

## 2024-08-23 RX ORDER — NALOXONE HCL 0.4 MG/ML
0.02 VIAL (ML) INJECTION
Status: DISCONTINUED | OUTPATIENT
Start: 2024-08-23 | End: 2024-08-25 | Stop reason: HOSPADM

## 2024-08-23 RX ORDER — PROCHLORPERAZINE EDISYLATE 5 MG/ML
5 INJECTION INTRAMUSCULAR; INTRAVENOUS EVERY 6 HOURS PRN
Status: DISCONTINUED | OUTPATIENT
Start: 2024-08-23 | End: 2024-08-25 | Stop reason: HOSPADM

## 2024-08-23 RX ORDER — CARBAMAZEPINE 200 MG/1
400 TABLET ORAL NIGHTLY
Status: DISCONTINUED | OUTPATIENT
Start: 2024-08-23 | End: 2024-08-24

## 2024-08-23 RX ORDER — BENZTROPINE MESYLATE 1 MG/1
1 TABLET ORAL 2 TIMES DAILY
Status: DISCONTINUED | OUTPATIENT
Start: 2024-08-24 | End: 2024-08-24

## 2024-08-23 RX ORDER — RISPERIDONE 1 MG/1
3 TABLET ORAL 2 TIMES DAILY
Status: DISCONTINUED | OUTPATIENT
Start: 2024-08-23 | End: 2024-08-24

## 2024-08-23 RX ORDER — AMOXICILLIN 250 MG
1 CAPSULE ORAL 2 TIMES DAILY
Status: DISCONTINUED | OUTPATIENT
Start: 2024-08-23 | End: 2024-08-25 | Stop reason: HOSPADM

## 2024-08-23 RX ORDER — ACETAMINOPHEN 325 MG/1
650 TABLET ORAL EVERY 8 HOURS PRN
Status: DISCONTINUED | OUTPATIENT
Start: 2024-08-23 | End: 2024-08-25 | Stop reason: HOSPADM

## 2024-08-23 RX ORDER — SODIUM CHLORIDE 0.9 % (FLUSH) 0.9 %
10 SYRINGE (ML) INJECTION EVERY 8 HOURS PRN
Status: DISCONTINUED | OUTPATIENT
Start: 2024-08-23 | End: 2024-08-25 | Stop reason: HOSPADM

## 2024-08-23 RX ORDER — TALC
6 POWDER (GRAM) TOPICAL NIGHTLY PRN
Status: DISCONTINUED | OUTPATIENT
Start: 2024-08-23 | End: 2024-08-25 | Stop reason: HOSPADM

## 2024-08-23 RX ORDER — ONDANSETRON HYDROCHLORIDE 2 MG/ML
4 INJECTION, SOLUTION INTRAVENOUS EVERY 6 HOURS PRN
Status: DISCONTINUED | OUTPATIENT
Start: 2024-08-23 | End: 2024-08-25 | Stop reason: HOSPADM

## 2024-08-23 RX ORDER — BENZTROPINE MESYLATE 1 MG/1
1 TABLET ORAL 2 TIMES DAILY
Status: DISCONTINUED | OUTPATIENT
Start: 2024-08-23 | End: 2024-08-23

## 2024-08-23 RX ADMIN — RISPERIDONE 3 MG: 1 TABLET, FILM COATED ORAL at 08:08

## 2024-08-23 RX ADMIN — SODIUM CHLORIDE: 9 INJECTION, SOLUTION INTRAVENOUS at 08:08

## 2024-08-23 RX ADMIN — SODIUM CHLORIDE 500 ML: 9 INJECTION, SOLUTION INTRAVENOUS at 07:08

## 2024-08-23 RX ADMIN — ENOXAPARIN SODIUM 40 MG: 40 INJECTION SUBCUTANEOUS at 05:08

## 2024-08-23 NOTE — SUBJECTIVE & OBJECTIVE
Interval Note: Mr Chamberlain is asymptomatic.  He reports the last time he was hyponatremic, he was weak and sluggish. He denies headache and weakness at this time. Corrected medication schedule. Discussed plan with his sister, Siobhan at bedside.  He has improvement in his sodium.  Will continue plan per nephrology recs.     Past Medical History:   Diagnosis Date    Bipolar disorder     Schizoaffective disorder        No past surgical history on file.    Review of patient's allergies indicates:  No Known Allergies    No current facility-administered medications on file prior to encounter.     Current Outpatient Medications on File Prior to Encounter   Medication Sig    benztropine (COGENTIN) 1 MG tablet Take 1 tablet by mouth 2 (two) times daily.    carBAMazepine (TEGRETOL) 200 mg tablet Take 1 tablet (200 mg total) by mouth every morning.    carBAMazepine (TEGRETOL) 200 mg tablet Take 2 tablets (400 mg total) by mouth every evening.    risperiDONE (RISPERDAL) 3 MG Tab Take 1 tablet by mouth 2 (two) times daily.    acetaminophen (TYLENOL) 325 MG tablet Take 2 tablets (650 mg total) by mouth every 8 (eight) hours as needed for Pain.    albuterol (PROVENTIL/VENTOLIN HFA) 90 mcg/actuation inhaler Inhale 2 puffs into the lungs every 4 (four) hours as needed for Shortness of Breath.    ANORO ELLIPTA 62.5-25 mcg/actuation DsDv Inhale 1 puff into the lungs once daily.    aspirin (ECOTRIN) 81 MG EC tablet Take 1 tablet (81 mg total) by mouth once daily.    atorvastatin (LIPITOR) 40 MG tablet Take 1 tablet (40 mg total) by mouth once daily.     Family History       Problem Relation (Age of Onset)    Asthma Mother    Diabetes Sister, Brother    Heart attack Father    Heart disease Father    Hypertension Brother    Seizures Sister          Tobacco Use    Smoking status: Every Day     Current packs/day: 1.00     Types: Cigarettes    Smokeless tobacco: Not on file   Substance and Sexual Activity    Alcohol use: No    Drug use: No     Sexual activity: Not on file     Review of Systems   Constitutional:  Negative for activity change, appetite change, chills, fatigue, fever and unexpected weight change.   HENT:  Negative for congestion, sore throat and trouble swallowing.    Eyes:  Negative for photophobia and visual disturbance.   Respiratory:  Negative for cough, chest tightness and shortness of breath.    Cardiovascular:  Negative for chest pain, palpitations and leg swelling.   Gastrointestinal:  Negative for abdominal pain, diarrhea and nausea.   Genitourinary:  Negative for dysuria, flank pain and hematuria.   Musculoskeletal:  Negative for back pain.   Neurological:  Negative for dizziness, weakness and headaches.   Psychiatric/Behavioral:  Negative for confusion.      Objective:     Vital Signs (Most Recent):  Temp: 98 °F (36.7 °C) (08/23/24 1735)  Pulse: 64 (08/23/24 1735)  Resp: 12 (08/23/24 1735)  BP: (!) 151/92 (08/23/24 1735)  SpO2: 96 % (08/23/24 1735) Vital Signs (24h Range):  Temp:  [98 °F (36.7 °C)-98.1 °F (36.7 °C)] 98 °F (36.7 °C)  Pulse:  [60-79] 64  Resp:  [12-18] 12  SpO2:  [96 %-100 %] 96 %  BP: (117-156)/(67-96) 151/92     Weight: 49.9 kg (110 lb)  Body mass index is 18.3 kg/m².     Physical Exam  Vitals reviewed.   Constitutional:       Appearance: Normal appearance. He is underweight.   HENT:      Head: Normocephalic.      Right Ear: Hearing normal.      Left Ear: Hearing normal.      Mouth/Throat:      Mouth: Mucous membranes are moist.      Pharynx: Oropharynx is clear.   Eyes:      General: Lids are normal. Vision grossly intact.   Cardiovascular:      Rate and Rhythm: Normal rate and regular rhythm.      Pulses: Normal pulses.      Heart sounds: Normal heart sounds.   Pulmonary:      Effort: Pulmonary effort is normal.      Breath sounds: Normal breath sounds.   Abdominal:      General: Abdomen is flat. Bowel sounds are normal.   Musculoskeletal:         General: Normal range of motion.      Cervical back: Normal range  of motion.      Right lower leg: No edema.      Left lower leg: No edema.   Skin:     General: Skin is warm and dry.   Neurological:      Mental Status: He is alert and oriented to person, place, and time. Mental status is at baseline.   Psychiatric:         Attention and Perception: Attention normal.         Mood and Affect: Mood and affect normal.         Speech: Speech normal.                Significant Labs: All pertinent labs within the past 24 hours have been reviewed.  BMP:   Recent Labs   Lab 08/24/24  1151   GLU 94  94   *  129*   K 4.5  4.5   CL 97  97   CO2 26  26   BUN 8  8   CREATININE 0.8  0.8   CALCIUM 8.5*  8.5*       Significant Imaging: I have reviewed all pertinent imaging results/findings within the past 24 hours.

## 2024-08-23 NOTE — FIRST PROVIDER EVALUATION
Emergency Department TeleTriage Encounter Note      CHIEF COMPLAINT    Chief Complaint   Patient presents with    Abnormal Lab     Family states that pt has been having trouble with low sodium levels and was recently discharged. He had labs drawn yesterday and was told to come back in for possible redraw and or treatment.        VITAL SIGNS   Initial Vitals [08/23/24 1339]   BP Pulse Resp Temp SpO2   117/67 79 18 98.1 °F (36.7 °C) 100 %      MAP       --            ALLERGIES    Review of patient's allergies indicates:  No Known Allergies    PROVIDER TRIAGE NOTE  Verbal consent for the teletriage evaluation was given by the patient at the start of the evaluation.  All efforts will be made to maintain patient's privacy during the evaluation.      This is a teletriage evaluation of a 64 y.o. male presenting to the ED with c/o low sodium levels; recent admission and discharge. Limited physical exam via telehealth: The patient is awake, alert, answering questions appropriately and is not in respiratory distress.  As the Teletriage provider, I performed an initial assessment and ordered appropriate labs and imaging studies, if any, to facilitate the patient's care once placed in the ED. Once a room is available, care and a full evaluation will be completed by an alternate ED provider.  Any additional orders and the final disposition will be determined by that provider.  All imaging and labs will not be followed-up by the Teletriage Team, including myself.          ORDERS  Labs Reviewed   CBC W/ AUTO DIFFERENTIAL   COMPREHENSIVE METABOLIC PANEL       ED Orders (720h ago, onward)      Start Ordered     Status Ordering Provider    08/23/24 1343 08/23/24 1342  Possible Hospitalization  Once        Comments: For further elaboration on reason for hospitalization.    Ordered ODALYS ARORA    08/23/24 1337 08/23/24 1336  CBC auto differential  STAT         Ordered ANTHONY ALVARADO    08/23/24 1337 08/23/24 1336   Comprehensive metabolic panel  STAT         Ordered ANTHONY ALVARADO              Virtual Visit Note: The provider triage portion of this emergency department evaluation and documentation was performed via [employer], a HIPAA-compliant telemedicine application, in concert with a tele-presenter in the room. A face to face patient evaluation with one of my colleagues will occur once the patient is placed in an emergency department room.      DISCLAIMER: This note was prepared with [employer] voice recognition transcription software. Garbled syntax, mangled pronouns, and other bizarre constructions may be attributed to that software system.

## 2024-08-23 NOTE — CONSULTS
REASON FOR CONSULTATION:     HPI:64 y.o. male with PMHx significant for bipolar disorder, seizure disorder, 50+ pack yr smoking history, newly diagnosed Squamous Cell Carcinoma of the LLL known to our group from a recent admission for hyponatremia.  He was discharged 8/12/2024 with Na 133.  His admit sodium was 122 and it appeared to be due to hypovolemic hyponatremia. Patient at risk for possible SIADH however laboratory studies studies not consistent with SIADH. Patient volume resuscitated with intravenous fluids.  Also considered was whether his home seizure medication carbamazepine may have caused his hyponatremia. This seems less likely as it corrected with volume resuscitation. Caregiver reports that his seizures have been difficult control without carbamazepine. We recommended he limit his free water intake to 500 cc a day but otherwise patient allowed to take in fluids with solute and okay to continue carbamazepine.     Today, he presented to ED after he was referred for an abnormal sodium on labs of 125.  Given his baseline intellectual challenges, it is not possible to ascertain whether he was able to stick to his free water restriction;     REVIEW OF SYSTEMS:    Constitutional:  Positive for fatigue and unexpected weight change. Negative for activity change, appetite change, chills and fever.   HENT:  Negative for congestion, sore throat and trouble swallowing.    Eyes:  Negative for photophobia and visual disturbance.   Respiratory:  Negative for cough, chest tightness and shortness of breath.    Cardiovascular:  Negative for chest pain, palpitations and leg swelling.   Gastrointestinal:  Negative for abdominal pain, diarrhea and nausea.   Genitourinary:  Negative for dysuria, flank pain and hematuria.   Musculoskeletal:  Negative for back pain.   Neurological:  Negative for dizziness, weakness and headaches.   Psychiatric/Behavioral:  Negative for confusion.      Past Medical History:   Diagnosis Date     Bipolar disorder     Schizoaffective disorder        No past surgical history on file.    Review of patient's allergies indicates:  No Known Allergies    Medications Prior to Admission   Medication Sig Dispense Refill Last Dose    benztropine (COGENTIN) 1 MG tablet Take 1 tablet by mouth 2 (two) times daily.   8/22/2024    carBAMazepine (TEGRETOL) 200 mg tablet Take 1 tablet (200 mg total) by mouth every morning.   8/22/2024    carBAMazepine (TEGRETOL) 200 mg tablet Take 2 tablets (400 mg total) by mouth every evening.   8/22/2024    risperiDONE (RISPERDAL) 3 MG Tab Take 1 tablet by mouth 2 (two) times daily.   8/22/2024    acetaminophen (TYLENOL) 325 MG tablet Take 2 tablets (650 mg total) by mouth every 8 (eight) hours as needed for Pain.       albuterol (PROVENTIL/VENTOLIN HFA) 90 mcg/actuation inhaler Inhale 2 puffs into the lungs every 4 (four) hours as needed for Shortness of Breath.       ANORO ELLIPTA 62.5-25 mcg/actuation DsDv Inhale 1 puff into the lungs once daily.       aspirin (ECOTRIN) 81 MG EC tablet Take 1 tablet (81 mg total) by mouth once daily.  0     atorvastatin (LIPITOR) 40 MG tablet Take 1 tablet (40 mg total) by mouth once daily. 90 tablet 3        Current Facility-Administered Medications   Medication Dose Route Frequency Provider Last Rate Last Admin    acetaminophen tablet 650 mg  650 mg Oral Q8H PRN Val Askew, NP        benztropine tablet 1 mg  1 mg Oral BID Val Askew, NP        [START ON 8/24/2024] carBAMazepine tablet 200 mg  200 mg Oral QAM Val Askew, NP        carBAMazepine tablet 400 mg  400 mg Oral QHS Val Askew, NP        enoxaparin injection 40 mg  40 mg Subcutaneous Daily Val Askew, JUAN        melatonin tablet 6 mg  6 mg Oral Nightly PRN Val Askew, JUAN        naloxone 0.4 mg/mL injection 0.02 mg  0.02 mg Intravenous PRN Val Askew, JUAN        ondansetron injection 4 mg  4 mg Intravenous Q6H PRN Val Askew, NP         prochlorperazine injection Soln 5 mg  5 mg Intravenous Q6H PRN Val Askew, JUAN        risperiDONE tablet 3 mg  3 mg Oral BID Val Askew, JUAN        senna-docusate 8.6-50 mg per tablet 1 tablet  1 tablet Oral BID Val Askew, JUAN        sodium chloride 0.9% flush 10 mL  10 mL Intravenous Q8H PRN Val Askew, JUAN           Social History     Socioeconomic History    Marital status: Single   Tobacco Use    Smoking status: Every Day     Current packs/day: 1.00     Types: Cigarettes   Substance and Sexual Activity    Alcohol use: No    Drug use: No     Social Determinants of Health     Financial Resource Strain: Patient Unable To Answer (8/12/2024)    Overall Financial Resource Strain (CARDIA)     Difficulty of Paying Living Expenses: Patient unable to answer   Food Insecurity: Patient Unable To Answer (8/12/2024)    Hunger Vital Sign     Worried About Running Out of Food in the Last Year: Patient unable to answer     Ran Out of Food in the Last Year: Patient unable to answer   Transportation Needs: Patient Unable To Answer (8/12/2024)    TRANSPORTATION NEEDS     Transportation : Patient unable to answer   Physical Activity: Patient Unable To Answer (8/12/2024)    Exercise Vital Sign     Days of Exercise per Week: Patient unable to answer     Minutes of Exercise per Session: Patient unable to answer   Stress: Patient Unable To Answer (8/12/2024)    [employer] - Occupational Stress Questionnaire     Feeling of Stress : Patient unable to answer   Housing Stability: Unknown (8/12/2024)    Housing Stability Vital Sign     Unable to Pay for Housing in the Last Year: Patient unable to answer       Family History   Problem Relation Name Age of Onset    Heart disease Father      Heart attack Father      Diabetes Sister      Seizures Sister      Hypertension Brother      Diabetes Brother      Asthma Mother         Temp:  [98 °F (36.7 °C)-98.1 °F (36.7 °C)] 98 °F (36.7  °C)  Pulse:  [60-79] 64  Resp:  [12-18] 12  SpO2:  [96 %-100 %] 96 %  BP: (117-156)/(67-96) 151/92      PHYSICAL EXAM:    GEN:  Alert, thin but not cachectic, difficult to understand  HEENT:  NCAT, MARKO, No conjunctivitis, normal sclera, O/P clear, no oropharyngial lesions, no thrush, neck supple, No LAD, Nml JVD, no carotid bruits  CV:  RRR no MRG  Lungs:  CTAb, no rhonchi, wheeze, crackles, normal excursion  Abdomen: Obese, soft, NTND, no fluid wave, no HSM, NABS  Ext:  No CCE, normal DP pulses bilat  Neuro:  Non-Focal, EOMI, gait not assessed  Skin:  No rash, excoriation, petchiae    Labs/EKG/Radiology:  Reviewed    ASSESSMENT AND PLAN:    Hyponatremia, recurrent  -Urine Na and Osms ordered  -give NS bolus 500 ml, then 100 ml per hour  -Adjust fluids accordingly  -Free water restrict  -Given persistence would give a trial of urea but is not available at [facility]  -Will see how he responds to the fluid resuscitation and possibly start PO NaCl tabs.    Addendum:   Preliminary labs reviewed, Urine Na 80; urine osms pending.  Urine Na of 80 more consistent with SIADH.  Given mild hypovolemia, however, probably a mixed picture of hypovolemic hyponatremia from poor intake on chronic drug induced SIADH (carbazmazepine).  Will continue free water restriction and give some gentle IVF's overnight until he is clearly euvolemic.    Thank you for the consult.  I'm happy to follow along with you.  Shreya Chamorro M.D.  Nephrology

## 2024-08-23 NOTE — ED PROVIDER NOTES
Encounter Date: 8/23/2024       History     Chief Complaint   Patient presents with    Abnormal Lab     Family states that pt has been having trouble with low sodium levels and was recently discharged. He had labs drawn yesterday and was told to come back in for possible redraw and or treatment.      This is a pleasant 64-year-old man with a past medical history significant for bipolar disorder as well as episodes of seizures in the past for which he currently takes carbamazepine.  He has been complicated by the fact that he has lung cancer, is currently awaiting resection as well as potential chemotherapy or radiation thereafter and has been suffering with low sodium levels over last several weeks.  He denies any specific complaints at this time, was seen by his primary physician yesterday after an inpatient stay had labs drawn which demonstrated low sodium and he was contacted with returned to the emergency department.  He does complain of some mild cramps in the lower extremities.  Sister says otherwise doing okay.        Review of patient's allergies indicates:  No Known Allergies  Past Medical History:   Diagnosis Date    Bipolar disorder     Schizoaffective disorder      No past surgical history on file.  Family History   Problem Relation Name Age of Onset    Heart disease Father      Heart attack Father      Diabetes Sister      Seizures Sister      Hypertension Brother      Diabetes Brother      Asthma Mother       Social History     Tobacco Use    Smoking status: Every Day     Current packs/day: 1.00     Types: Cigarettes   Substance Use Topics    Alcohol use: No    Drug use: No     Review of Systems  Constitutional-no fever  HEENT-no congestion  Eyes-no redness  Respiratory-no shortness of breath  Cardio-no chest pain  GI-no abdominal pain  Endocrine-no cold intolerance  -no difficulty urinating  MSK-no myalgias  Skin-no rashes  Allergy-no environmental allergy  Neurologic-, no headache  Hematology-no  swollen nodes  Behavioral-no confusion  Physical Exam     Initial Vitals [08/23/24 1339]   BP Pulse Resp Temp SpO2   117/67 79 18 98.1 °F (36.7 °C) 100 %      MAP       --         Physical Exam  Constitutional:  Thin chronically ill-appearing 64-year-old man  Eyes: Conjunctivae normal.  ENT       Head: Normocephalic, atraumatic.       Nose: Normal external appearance        Mouth/Throat: no strigulous respirations   Hematological/Lymphatic/Immunilogical: no visible lymphadenopathy   Cardiovascular: Normal rate,   Respiratory: Normal respiratory effort.   Gastrointestinal: non distended   Musculoskeletal: Normal range of motion in all extremities. No obvious deformities or swelling.  Neurologic: Alert, oriented. Normal speech and language. No gross focal neurologic deficits are appreciated.  Skin: Skin is warm, dry. No rash noted.  Psychiatric: Mood and affect are normal.   ED Course   Critical Care    Date/Time: 8/23/2024 2:30 PM    Performed by: Harrison Lee MD  Authorized by: Harrison Lee MD  Direct patient critical care time: 20 minutes  Additional history critical care time: 4 minutes  Ordering / reviewing critical care time: 4 minutes  Documentation critical care time: 3 minutes  Consulting other physicians critical care time: 3 minutes  Total critical care time (exclusive of procedural time) : 34 minutes  Critical care time was exclusive of separately billable procedures and treating other patients and teaching time.  Critical care was necessary to treat or prevent imminent or life-threatening deterioration of the following conditions: metabolic crisis and toxidrome.  Critical care was time spent personally by me on the following activities: blood draw for specimens, development of treatment plan with patient or surrogate, discussions with primary provider, interpretation of cardiac output measurements, evaluation of patient's response to treatment, examination of patient, obtaining history  from patient or surrogate, ordering and performing treatments and interventions, ordering and review of laboratory studies, pulse oximetry and re-evaluation of patient's condition.        Labs Reviewed   CBC W/ AUTO DIFFERENTIAL - Abnormal       Result Value    WBC 5.16      RBC 3.87 (*)     Hemoglobin 12.4 (*)     Hematocrit 34.0 (*)     MCV 88      MCH 32.0 (*)     MCHC 36.5 (*)     RDW 13.0      Platelets 207      MPV 8.8 (*)     Immature Granulocytes 0.2      Gran # (ANC) 3.5      Immature Grans (Abs) 0.01      Lymph # 1.2      Mono # 0.4      Eos # 0.0      Baso # 0.02      nRBC 0      Gran % 68.0      Lymph % 22.9      Mono % 8.5      Eosinophil % 0.0      Basophil % 0.4      Differential Method Automated     COMPREHENSIVE METABOLIC PANEL - Abnormal    Sodium 125 (*)     Potassium 4.8      Chloride 91 (*)     CO2 22 (*)     Glucose 152 (*)     BUN 9      Creatinine 1.0      Calcium 8.9      Total Protein 8.0      Albumin 3.8      Total Bilirubin 0.2      Alkaline Phosphatase 112      AST 28      ALT 11      eGFR >60      Anion Gap 12     URINALYSIS, REFLEX TO URINE CULTURE    Specimen UA Urine, Clean Catch      Color, UA Yellow      Appearance, UA Clear      pH, UA 8.0      Specific Gravity, UA 1.010      Protein, UA Negative      Glucose, UA Negative      Ketones, UA Negative      Bilirubin (UA) Negative      Occult Blood UA Negative      Nitrite, UA Negative      Urobilinogen, UA Negative      Leukocytes, UA Negative      Narrative:     Specimen Source->Urine   SODIUM, URINE, RANDOM    Sodium, Urine 83      Narrative:     Specimen Source->Urine   CREATININE, URINE, RANDOM    Creatinine, Urine 54.0      Narrative:     Specimen Source->Urine          Imaging Results    None          Medications   sodium chloride 0.9% flush 10 mL (has no administration in time range)   melatonin tablet 6 mg (has no administration in time range)   ondansetron injection 4 mg (has no administration in time range)    prochlorperazine injection Soln 5 mg (has no administration in time range)   senna-docusate 8.6-50 mg per tablet 1 tablet (1 tablet Oral Not Given 8/23/24 2012)   acetaminophen tablet 650 mg (has no administration in time range)   naloxone 0.4 mg/mL injection 0.02 mg (has no administration in time range)   enoxaparin injection 40 mg (40 mg Subcutaneous Given 8/23/24 1742)   carBAMazepine tablet 200 mg (200 mg Oral Given 8/24/24 0630)   0.9%  NaCl infusion ( Intravenous Verify Only 8/24/24 0632)   benztropine tablet 1 mg (has no administration in time range)   benztropine tablet 1 mg (has no administration in time range)   benztropine tablet 1 mg (has no administration in time range)   carBAMazepine tablet 400 mg (has no administration in time range)   risperiDONE tablet 3 mg (has no administration in time range)   risperiDONE tablet 3 mg (has no administration in time range)   sodium chloride 0.9% bolus 500 mL 500 mL (0 mLs Intravenous Stopped 8/23/24 2055)     Medical Decision Making  Differential diagnosis-hyponatremia, electrolyte derangement, lab error    Lab yesterday Na 127, today Na 125.   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    Given that his sodium is down trending currently from previous outpatient evaluation yesterday as well as during discharge most recent encounter believe he would benefit from hospitalization, will obtain urine electrolytes, frequent sodium rechecks.  Otherwise asymptomatic at this time.    Problems Addressed:  Hyponatremia: acute illness or injury    Amount and/or Complexity of Data Reviewed  Independent Historian: caregiver     Details: Sister at the bedside notes that he was seen by primary physician yesterday and had labs drawn  External Data Reviewed: labs, radiology, ECG and notes.     Details: Previous  hospital stay for hyponatremia  Labs: ordered. Decision-making details documented in ED Course.    Risk  OTC drugs.  Prescription drug management.  Parenteral controlled substances.  Decision regarding hospitalization.  Diagnosis or treatment significantly limited by social determinants of health.  Risk Details: Mental health issues complicate this patient's care as a social determinants of health               ED Course as of 08/24/24 0926   Fri Aug 23, 2024   1528 Received patient's care at 3:00 p.m. pending discussion with Hospital Medicine disposition.  Patient has been accepted to Dr. Clemente's service.  Will consult obtunded Nephrology who is familiar with the patient from previous visit. [TH]      ED Course User Index  [TH] Raphael Johnson II, MD                           Clinical Impression:  Final diagnoses:  [E87.1] Hyponatremia (Primary)          ED Disposition Condition    Observation Stable                Harrison Lee MD  08/24/24 9002

## 2024-08-23 NOTE — ASSESSMENT & PLAN NOTE
Noted,  Pt does not report recent seizure activity.     - continue for now pending nephrology recommendations

## 2024-08-23 NOTE — H&P
[facility] Surg [address] Medicine  History & Physical    Patient Name: Bulmaro Tabares  MRN: [id number]  Patient Class: OP- Observation  Admission Date: 8/23/2024  Attending Physician: Becky Clemente MD   Primary Care Provider: [facility]         Patient information was obtained from patient, relative(s), past medical records, and ER records.     Subjective:     Principal Problem:Hyponatremia    Chief Complaint:   Chief Complaint   Patient presents with    Abnormal Lab     Family states that pt has been having trouble with low sodium levels and was recently discharged. He had labs drawn yesterday and was told to come back in for possible redraw and or treatment.         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 He has was recently admitted for hyponatremia on 8/10, and discharged on 8/12. He denies any other complaints at this time, and states he has been feeling better over the past few days. Patient denies chest pain, shortness of breath, nausea, vomiting, urinary symptoms, fever and chills. During previous admission, patient's NA corrected with free water restriction. [facility] consulted.     Per chart review, patient recently diagnosed with lung cancer and is followed at [facility] by Dr. Dempsey. He is following Dr. Dempsey for potential resection, and future chemotherapy and radion.       In ED Patient , other lab work unremarkable. Patient will be admitted to Hospital Medicine for further evaluation and management.     Past Medical History:   Diagnosis Date    Bipolar disorder     Schizoaffective disorder        No past surgical history on file.    Review of patient's allergies indicates:  No Known Allergies    No current  facility-administered medications on file prior to encounter.     Current Outpatient Medications on File Prior to Encounter   Medication Sig    benztropine (COGENTIN) 1 MG tablet Take 1 tablet by mouth 2 (two) times daily.    carBAMazepine (TEGRETOL) 200 mg tablet Take 1 tablet (200 mg total) by mouth every morning.    carBAMazepine (TEGRETOL) 200 mg tablet Take 2 tablets (400 mg total) by mouth every evening.    risperiDONE (RISPERDAL) 3 MG Tab Take 1 tablet by mouth 2 (two) times daily.    acetaminophen (TYLENOL) 325 MG tablet Take 2 tablets (650 mg total) by mouth every 8 (eight) hours as needed for Pain.    albuterol (PROVENTIL/VENTOLIN HFA) 90 mcg/actuation inhaler Inhale 2 puffs into the lungs every 4 (four) hours as needed for Shortness of Breath.    ANORO ELLIPTA 62.5-25 mcg/actuation DsDv Inhale 1 puff into the lungs once daily.    aspirin (ECOTRIN) 81 MG EC tablet Take 1 tablet (81 mg total) by mouth once daily.    atorvastatin (LIPITOR) 40 MG tablet Take 1 tablet (40 mg total) by mouth once daily.     Family History       Problem Relation (Age of Onset)    Asthma Mother    Diabetes Sister, Brother    Heart attack Father    Heart disease Father    Hypertension Brother    Seizures Sister          Tobacco Use    Smoking status: Every Day     Current packs/day: 1.00     Types: Cigarettes    Smokeless tobacco: Not on file   Substance and Sexual Activity    Alcohol use: No    Drug use: No    Sexual activity: Not on file     Review of Systems   Constitutional:  Positive for fatigue and unexpected weight change. Negative for activity change, appetite change, chills and fever.   HENT:  Negative for congestion, sore throat and trouble swallowing.    Eyes:  Negative for photophobia and visual disturbance.   Respiratory:  Negative for cough, chest tightness and shortness of breath.    Cardiovascular:  Negative for chest pain, palpitations and leg swelling.   Gastrointestinal:  Negative for abdominal pain, diarrhea  and nausea.   Genitourinary:  Negative for dysuria, flank pain and hematuria.   Musculoskeletal:  Negative for back pain.   Neurological:  Negative for dizziness, weakness and headaches.   Psychiatric/Behavioral:  Negative for confusion.      Objective:     Vital Signs (Most Recent):  Temp: 98.1 °F (36.7 °C) (08/23/24 1339)  Pulse: 64 (08/23/24 1540)  Resp: 18 (08/23/24 1339)  BP: (!) 146/93 (08/23/24 1540)  SpO2: 100 % (08/23/24 1540) Vital Signs (24h Range):  Temp:  [98.1 °F (36.7 °C)] 98.1 °F (36.7 °C)  Pulse:  [64-79] 64  Resp:  [18] 18  SpO2:  [100 %] 100 %  BP: (117-146)/(67-93) 146/93     Weight: 49.9 kg (110 lb)  Body mass index is 18.3 kg/m².     Physical Exam  Vitals reviewed.   Constitutional:       Appearance: Normal appearance. He is underweight.   HENT:      Head: Normocephalic.      Right Ear: Hearing normal.      Left Ear: Hearing normal.      Mouth/Throat:      Mouth: Mucous membranes are moist.      Pharynx: Oropharynx is clear.   Eyes:      General: Lids are normal. Vision grossly intact.   Cardiovascular:      Rate and Rhythm: Normal rate and regular rhythm.      Pulses: Normal pulses.      Heart sounds: Normal heart sounds.   Pulmonary:      Effort: Pulmonary effort is normal.      Breath sounds: Normal breath sounds.   Abdominal:      General: Abdomen is flat. Bowel sounds are normal.   Musculoskeletal:         General: Normal range of motion.      Cervical back: Normal range of motion.      Right lower leg: No edema.      Left lower leg: No edema.   Skin:     General: Skin is warm and dry.   Neurological:      Mental Status: He is alert and oriented to person, place, and time. Mental status is at baseline.   Psychiatric:         Attention and Perception: Attention normal.         Mood and Affect: Mood and affect normal.         Speech: Speech normal.                Significant Labs: All pertinent labs within the past 24 hours have been reviewed.  CBC:   Recent Labs   Lab 08/23/24  1409   WBC  5.16   HGB 12.4*   HCT 34.0*        CMP:   Recent Labs   Lab 08/23/24  1409   *   K 4.8   CL 91*   CO2 22*   *   BUN 9   CREATININE 1.0   CALCIUM 8.9   PROT 8.0   ALBUMIN 3.8   BILITOT 0.2   ALKPHOS 112   AST 28   ALT 11   ANIONGAP 12     Assessment/Plan:     * Hyponatremia  Patient presented to ED following a primary care visit where his lab work revealed Na+ 125. Patient with hx of lung cancer and taking carbamezapine which could contribute to hyponatremia. Recently admitted with hyponatremia 8/10/24    - Free water restriction.   - Trend Na Q6  - Consult placed for Nephrology   - Aim to correct 4-6 mmol/L over 24 hrs       Primary cancer of left lower lobe of lung  Recently diagnosed 6/2024 and followed Dr Dempsey. Has an appointment for follow Tuesday 8/27/24 to discuss treatment plan    Follow up with Dr Dempsey as scheduled      Bipolar disorder  Noted    Continue cogentin and Risperdal       Seizure disorder  Noted,  Pt does not report recent seizure activity.     - continue for now pending nephrology recommendations      VTE Risk Mitigation (From admission, onward)           Ordered     enoxaparin injection 40 mg  Daily         08/23/24 1541     IP VTE HIGH RISK PATIENT  Once         08/23/24 1541     Place sequential compression device  Until discontinued         08/23/24 1541                         On 08/23/2024, patient should be placed in hospital observation services          Val Askew NP  Department of Hospital Medicine  [facility] ([address])

## 2024-08-23 NOTE — HPI
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 He has was recently admitted for hyponatremia on 8/10, and discharged on 8/12. He denies any other complaints at this time, and states he has been feeling better over the past few days. Patient denies chest pain, shortness of breath, nausea, vomiting, urinary symptoms, fever and chills. During previous admission, patient's NA corrected with free water restriction. [facility] consulted.     Per chart review, patient recently diagnosed with lung cancer and is followed at [facility] by Dr. Dempsey. He is following Dr. Dempsey for potential resection, and future chemotherapy and radion.       In ED Patient , other lab work unremarkable. Patient will be admitted to Hospital Medicine for further evaluation and management.

## 2024-08-23 NOTE — SUBJECTIVE & OBJECTIVE
Past Medical History:   Diagnosis Date    Bipolar disorder     Schizoaffective disorder        No past surgical history on file.    Review of patient's allergies indicates:  No Known Allergies    No current facility-administered medications on file prior to encounter.     Current Outpatient Medications on File Prior to Encounter   Medication Sig    benztropine (COGENTIN) 1 MG tablet Take 1 tablet by mouth 2 (two) times daily.    carBAMazepine (TEGRETOL) 200 mg tablet Take 1 tablet (200 mg total) by mouth every morning.    carBAMazepine (TEGRETOL) 200 mg tablet Take 2 tablets (400 mg total) by mouth every evening.    risperiDONE (RISPERDAL) 3 MG Tab Take 1 tablet by mouth 2 (two) times daily.    acetaminophen (TYLENOL) 325 MG tablet Take 2 tablets (650 mg total) by mouth every 8 (eight) hours as needed for Pain.    albuterol (PROVENTIL/VENTOLIN HFA) 90 mcg/actuation inhaler Inhale 2 puffs into the lungs every 4 (four) hours as needed for Shortness of Breath.    ANORO ELLIPTA 62.5-25 mcg/actuation DsDv Inhale 1 puff into the lungs once daily.    aspirin (ECOTRIN) 81 MG EC tablet Take 1 tablet (81 mg total) by mouth once daily.    atorvastatin (LIPITOR) 40 MG tablet Take 1 tablet (40 mg total) by mouth once daily.     Family History       Problem Relation (Age of Onset)    Asthma Mother    Diabetes Sister, Brother    Heart attack Father    Heart disease Father    Hypertension Brother    Seizures Sister          Tobacco Use    Smoking status: Every Day     Current packs/day: 1.00     Types: Cigarettes    Smokeless tobacco: Not on file   Substance and Sexual Activity    Alcohol use: No    Drug use: No    Sexual activity: Not on file     Review of Systems   Constitutional:  Positive for fatigue and unexpected weight change. Negative for activity change, appetite change, chills and fever.   HENT:  Negative for congestion, sore throat and trouble swallowing.    Eyes:  Negative for photophobia and visual disturbance.    Respiratory:  Negative for cough, chest tightness and shortness of breath.    Cardiovascular:  Negative for chest pain, palpitations and leg swelling.   Gastrointestinal:  Negative for abdominal pain, diarrhea and nausea.   Genitourinary:  Negative for dysuria, flank pain and hematuria.   Musculoskeletal:  Negative for back pain.   Neurological:  Negative for dizziness, weakness and headaches.   Psychiatric/Behavioral:  Negative for confusion.      Objective:     Vital Signs (Most Recent):  Temp: 98.1 °F (36.7 °C) (08/23/24 1339)  Pulse: 64 (08/23/24 1540)  Resp: 18 (08/23/24 1339)  BP: (!) 146/93 (08/23/24 1540)  SpO2: 100 % (08/23/24 1540) Vital Signs (24h Range):  Temp:  [98.1 °F (36.7 °C)] 98.1 °F (36.7 °C)  Pulse:  [64-79] 64  Resp:  [18] 18  SpO2:  [100 %] 100 %  BP: (117-146)/(67-93) 146/93     Weight: 49.9 kg (110 lb)  Body mass index is 18.3 kg/m².     Physical Exam  Vitals reviewed.   Constitutional:       Appearance: Normal appearance. He is underweight.   HENT:      Head: Normocephalic.      Right Ear: Hearing normal.      Left Ear: Hearing normal.      Mouth/Throat:      Mouth: Mucous membranes are moist.      Pharynx: Oropharynx is clear.   Eyes:      General: Lids are normal. Vision grossly intact.   Cardiovascular:      Rate and Rhythm: Normal rate and regular rhythm.      Pulses: Normal pulses.      Heart sounds: Normal heart sounds.   Pulmonary:      Effort: Pulmonary effort is normal.      Breath sounds: Normal breath sounds.   Abdominal:      General: Abdomen is flat. Bowel sounds are normal.   Musculoskeletal:         General: Normal range of motion.      Cervical back: Normal range of motion.      Right lower leg: No edema.      Left lower leg: No edema.   Skin:     General: Skin is warm and dry.   Neurological:      Mental Status: He is alert and oriented to person, place, and time. Mental status is at baseline.   Psychiatric:         Attention and Perception: Attention normal.         Mood  and Affect: Mood and affect normal.         Speech: Speech normal.                Significant Labs: All pertinent labs within the past 24 hours have been reviewed.  CBC:   Recent Labs   Lab 08/23/24  1409   WBC 5.16   HGB 12.4*   HCT 34.0*        CMP:   Recent Labs   Lab 08/23/24  1409   *   K 4.8   CL 91*   CO2 22*   *   BUN 9   CREATININE 1.0   CALCIUM 8.9   PROT 8.0   ALBUMIN 3.8   BILITOT 0.2   ALKPHOS 112   AST 28   ALT 11   ANIONGAP 12

## 2024-08-23 NOTE — ASSESSMENT & PLAN NOTE
Patient presented to ED following a primary care visit where his lab work revealed Na+ 125. Patient with hx of lung cancer and taking carbamezapine which could contribute to hyponatremia. Recently admitted with hyponatremia 8/10/24    - Free water restriction.   - Trend Na Q6  - Consult placed for Nephrology   - Aim to correct 4-6 mmol/L over 24 hrs

## 2024-08-23 NOTE — ASSESSMENT & PLAN NOTE
Recently diagnosed 6/2024 and followed Dr Dempsey. Has an appointment for follow Tuesday 8/27/24 to discuss treatment plan    Follow up with Dr Dempsey as scheduled

## 2024-08-23 NOTE — ED TRIAGE NOTES
Recent admission for hyponatremia. States he had labs drawn yesterday and was told to come to ED for possible treatment for same - unknown lab value per patient. States recent dx of lung CA. Presents awake, alert. Denies pain, weakness, N/V. No distress noted.

## 2024-08-24 LAB
ANION GAP SERPL CALC-SCNC: 6 MMOL/L (ref 8–16)
ANION GAP SERPL CALC-SCNC: 7 MMOL/L (ref 8–16)
ANION GAP SERPL CALC-SCNC: 7 MMOL/L (ref 8–16)
ANION GAP SERPL CALC-SCNC: 9 MMOL/L (ref 8–16)
BASOPHILS # BLD AUTO: 0.03 K/UL (ref 0–0.2)
BASOPHILS NFR BLD: 0.5 % (ref 0–1.9)
BUN SERPL-MCNC: 11 MG/DL (ref 8–23)
BUN SERPL-MCNC: 13 MG/DL (ref 8–23)
BUN SERPL-MCNC: 13 MG/DL (ref 8–23)
BUN SERPL-MCNC: 8 MG/DL (ref 8–23)
BUN SERPL-MCNC: 8 MG/DL (ref 8–23)
BUN SERPL-MCNC: 9 MG/DL (ref 8–23)
BUN SERPL-MCNC: 9 MG/DL (ref 8–23)
CALCIUM SERPL-MCNC: 8.4 MG/DL (ref 8.7–10.5)
CALCIUM SERPL-MCNC: 8.5 MG/DL (ref 8.7–10.5)
CALCIUM SERPL-MCNC: 8.5 MG/DL (ref 8.7–10.5)
CALCIUM SERPL-MCNC: 8.7 MG/DL (ref 8.7–10.5)
CHLORIDE SERPL-SCNC: 93 MMOL/L (ref 95–110)
CHLORIDE SERPL-SCNC: 93 MMOL/L (ref 95–110)
CHLORIDE SERPL-SCNC: 95 MMOL/L (ref 95–110)
CHLORIDE SERPL-SCNC: 95 MMOL/L (ref 95–110)
CHLORIDE SERPL-SCNC: 97 MMOL/L (ref 95–110)
CHLORIDE SERPL-SCNC: 97 MMOL/L (ref 95–110)
CHLORIDE SERPL-SCNC: 99 MMOL/L (ref 95–110)
CO2 SERPL-SCNC: 23 MMOL/L (ref 23–29)
CO2 SERPL-SCNC: 25 MMOL/L (ref 23–29)
CO2 SERPL-SCNC: 25 MMOL/L (ref 23–29)
CO2 SERPL-SCNC: 26 MMOL/L (ref 23–29)
CREAT SERPL-MCNC: 0.8 MG/DL (ref 0.5–1.4)
CREAT SERPL-MCNC: 0.8 MG/DL (ref 0.5–1.4)
CREAT SERPL-MCNC: 0.9 MG/DL (ref 0.5–1.4)
DIFFERENTIAL METHOD BLD: ABNORMAL
EOSINOPHIL # BLD AUTO: 0 K/UL (ref 0–0.5)
EOSINOPHIL NFR BLD: 0 % (ref 0–8)
ERYTHROCYTE [DISTWIDTH] IN BLOOD BY AUTOMATED COUNT: 13.2 % (ref 11.5–14.5)
EST. GFR  (NO RACE VARIABLE): >60 ML/MIN/1.73 M^2
GLUCOSE SERPL-MCNC: 103 MG/DL (ref 70–110)
GLUCOSE SERPL-MCNC: 103 MG/DL (ref 70–110)
GLUCOSE SERPL-MCNC: 130 MG/DL (ref 70–110)
GLUCOSE SERPL-MCNC: 86 MG/DL (ref 70–110)
GLUCOSE SERPL-MCNC: 86 MG/DL (ref 70–110)
GLUCOSE SERPL-MCNC: 94 MG/DL (ref 70–110)
GLUCOSE SERPL-MCNC: 94 MG/DL (ref 70–110)
HCT VFR BLD AUTO: 32.9 % (ref 40–54)
HGB BLD-MCNC: 11.4 G/DL (ref 14–18)
IMM GRANULOCYTES # BLD AUTO: 0.02 K/UL (ref 0–0.04)
IMM GRANULOCYTES NFR BLD AUTO: 0.4 % (ref 0–0.5)
LYMPHOCYTES # BLD AUTO: 2.1 K/UL (ref 1–4.8)
LYMPHOCYTES NFR BLD: 36.2 % (ref 18–48)
MCH RBC QN AUTO: 31.1 PG (ref 27–31)
MCHC RBC AUTO-ENTMCNC: 34.7 G/DL (ref 32–36)
MCV RBC AUTO: 90 FL (ref 82–98)
MONOCYTES # BLD AUTO: 0.7 K/UL (ref 0.3–1)
MONOCYTES NFR BLD: 12 % (ref 4–15)
NEUTROPHILS # BLD AUTO: 2.9 K/UL (ref 1.8–7.7)
NEUTROPHILS NFR BLD: 50.9 % (ref 38–73)
NRBC BLD-RTO: 0 /100 WBC
PLATELET # BLD AUTO: 193 K/UL (ref 150–450)
PMV BLD AUTO: 9.1 FL (ref 9.2–12.9)
POTASSIUM SERPL-SCNC: 3.9 MMOL/L (ref 3.5–5.1)
POTASSIUM SERPL-SCNC: 4.1 MMOL/L (ref 3.5–5.1)
POTASSIUM SERPL-SCNC: 4.1 MMOL/L (ref 3.5–5.1)
POTASSIUM SERPL-SCNC: 4.3 MMOL/L (ref 3.5–5.1)
POTASSIUM SERPL-SCNC: 4.3 MMOL/L (ref 3.5–5.1)
POTASSIUM SERPL-SCNC: 4.5 MMOL/L (ref 3.5–5.1)
POTASSIUM SERPL-SCNC: 4.5 MMOL/L (ref 3.5–5.1)
RBC # BLD AUTO: 3.67 M/UL (ref 4.6–6.2)
SODIUM SERPL-SCNC: 126 MMOL/L (ref 136–145)
SODIUM SERPL-SCNC: 129 MMOL/L (ref 136–145)
SODIUM SERPL-SCNC: 129 MMOL/L (ref 136–145)
SODIUM SERPL-SCNC: 131 MMOL/L (ref 136–145)
WBC # BLD AUTO: 5.67 K/UL (ref 3.9–12.7)

## 2024-08-24 PROCEDURE — 80048 BASIC METABOLIC PNL TOTAL CA: CPT | Mod: 91 | Performed by: INTERNAL MEDICINE

## 2024-08-24 PROCEDURE — 80048 BASIC METABOLIC PNL TOTAL CA: CPT | Performed by: NURSE PRACTITIONER

## 2024-08-24 PROCEDURE — 11000001 HC ACUTE MED/SURG PRIVATE ROOM

## 2024-08-24 PROCEDURE — 25000003 PHARM REV CODE 250: Performed by: NURSE PRACTITIONER

## 2024-08-24 PROCEDURE — 36415 COLL VENOUS BLD VENIPUNCTURE: CPT | Mod: XB | Performed by: INTERNAL MEDICINE

## 2024-08-24 PROCEDURE — 36415 COLL VENOUS BLD VENIPUNCTURE: CPT | Performed by: NURSE PRACTITIONER

## 2024-08-24 PROCEDURE — 25000003 PHARM REV CODE 250: Performed by: INTERNAL MEDICINE

## 2024-08-24 PROCEDURE — 96361 HYDRATE IV INFUSION ADD-ON: CPT

## 2024-08-24 PROCEDURE — 94761 N-INVAS EAR/PLS OXIMETRY MLT: CPT

## 2024-08-24 PROCEDURE — 85025 COMPLETE CBC W/AUTO DIFF WBC: CPT | Performed by: NURSE PRACTITIONER

## 2024-08-24 PROCEDURE — 80048 BASIC METABOLIC PNL TOTAL CA: CPT | Mod: 91 | Performed by: NURSE PRACTITIONER

## 2024-08-24 PROCEDURE — 63600175 PHARM REV CODE 636 W HCPCS: Performed by: NURSE PRACTITIONER

## 2024-08-24 RX ORDER — RISPERIDONE 1 MG/1
3 TABLET ORAL EVERY 24 HOURS
Status: DISCONTINUED | OUTPATIENT
Start: 2024-08-24 | End: 2024-08-25 | Stop reason: HOSPADM

## 2024-08-24 RX ORDER — BENZTROPINE MESYLATE 1 MG/1
1 TABLET ORAL DAILY
Status: DISCONTINUED | OUTPATIENT
Start: 2024-08-24 | End: 2024-08-24

## 2024-08-24 RX ORDER — BENZTROPINE MESYLATE 1 MG/1
1 TABLET ORAL DAILY
Status: COMPLETED | OUTPATIENT
Start: 2024-08-24 | End: 2024-08-24

## 2024-08-24 RX ORDER — CARBAMAZEPINE 200 MG/1
400 TABLET ORAL EVERY 24 HOURS
Status: DISCONTINUED | OUTPATIENT
Start: 2024-08-24 | End: 2024-08-25 | Stop reason: HOSPADM

## 2024-08-24 RX ORDER — BENZTROPINE MESYLATE 1 MG/1
1 TABLET ORAL
Status: DISCONTINUED | OUTPATIENT
Start: 2024-08-25 | End: 2024-08-25 | Stop reason: HOSPADM

## 2024-08-24 RX ORDER — BENZTROPINE MESYLATE 1 MG/1
1 TABLET ORAL EVERY 24 HOURS
Status: DISCONTINUED | OUTPATIENT
Start: 2024-08-24 | End: 2024-08-25 | Stop reason: HOSPADM

## 2024-08-24 RX ORDER — SODIUM CHLORIDE 1 G/1
1000 TABLET ORAL 2 TIMES DAILY
Status: DISCONTINUED | OUTPATIENT
Start: 2024-08-24 | End: 2024-08-25 | Stop reason: HOSPADM

## 2024-08-24 RX ADMIN — SODIUM CHLORIDE 1000 MG: 1 TABLET ORAL at 12:08

## 2024-08-24 RX ADMIN — DOCUSATE SODIUM AND SENNOSIDES 1 TABLET: 8.6; 5 TABLET, FILM COATED ORAL at 08:08

## 2024-08-24 RX ADMIN — CARBAMAZEPINE 200 MG: 200 TABLET ORAL at 06:08

## 2024-08-24 RX ADMIN — CARBAMAZEPINE 400 MG: 200 TABLET ORAL at 03:08

## 2024-08-24 RX ADMIN — SODIUM CHLORIDE: 9 INJECTION, SOLUTION INTRAVENOUS at 05:08

## 2024-08-24 RX ADMIN — ENOXAPARIN SODIUM 40 MG: 40 INJECTION SUBCUTANEOUS at 04:08

## 2024-08-24 RX ADMIN — RISPERIDONE 3 MG: 1 TABLET, FILM COATED ORAL at 08:08

## 2024-08-24 RX ADMIN — DOCUSATE SODIUM AND SENNOSIDES 1 TABLET: 8.6; 5 TABLET, FILM COATED ORAL at 10:08

## 2024-08-24 RX ADMIN — SODIUM CHLORIDE 1000 MG: 1 TABLET ORAL at 08:08

## 2024-08-24 RX ADMIN — BENZTROPINE MESYLATE 1 MG: 1 TABLET ORAL at 10:08

## 2024-08-24 RX ADMIN — RISPERIDONE 3 MG: 1 TABLET, FILM COATED ORAL at 03:08

## 2024-08-24 RX ADMIN — BENZTROPINE MESYLATE 1 MG: 1 TABLET ORAL at 03:08

## 2024-08-24 NOTE — PLAN OF CARE
08/24/24 1007   BERGMAN Message   Medicare Outpatient and Observation Notification regarding financial responsibility Given to patient/caregiver;Explained to patient/caregiver;Signed/date by patient/caregiver   Date BERGMAN was signed 08/24/24   Time BERGMAN was signed [id number]

## 2024-08-24 NOTE — PLAN OF CARE
08/24/24 1015   Readmission   Was this a planned readmission? No   Why were you hospitalized in the last 30 days? low sodium   Why were you readmitted? Related to previous admission   When you left the hospital how did you feel? good   When you left the hospital where did you go? Home with Family   Did patient/caregiver refused recommended DC plan? No   Tell me about what happened between when you left the hospital and the day you returned. pt went to PCP for follow up visit and his sodim levels were low   Did you try to manage your symptoms your self? Yes   What did you do? follow diet plan   Did you call anyone? No   Did you try to see or did see a doctor or nurse before you came? Yes   Were you seen? Yes   Did you have  a follow-up appointment on discharge? Yes   Did you go? Yes     [facility] ([address])  Discharge Reassessment    Primary Care Provider: No, Primary Doctor    Expected Discharge Date:

## 2024-08-24 NOTE — PLAN OF CARE
Problem: Adult Inpatient Plan of Care  Goal: Plan of Care Review  Outcome: Progressing  Goal: Patient-Specific Goal (Individualized)  Outcome: Progressing  Goal: Absence of Hospital-Acquired Illness or Injury  Outcome: Progressing  Goal: Optimal Comfort and Wellbeing  Outcome: Progressing  Goal: Readiness for Transition of Care  Outcome: Progressing     Problem: Infection  Goal: Absence of Infection Signs and Symptoms  Outcome: Progressing     Problem: Electrolyte Imbalance  Goal: Electrolyte Balance  Outcome: Progressing   Patient awake and alert. VSS.NAD. Q2 rounding completed. Safety maintained.

## 2024-08-24 NOTE — PLAN OF CARE
Pt is Alert and Oriented.  Does not use DME.  Pt PCP is Dr. Childers at [facility]. Independent at baseline.  Family will transport pt to home when discharged.     08/24/24 1008   Discharge Assessment   Assessment Type Discharge Planning Assessment   Confirmed/corrected address, phone number and insurance Yes   Confirmed Demographics Correct on Facesheet   Source of Information patient;family   When was your last doctors appointment? 08/22/24   Communicated DELBERT with patient/caregiver Date not available/Unable to determine   Reason For Admission Hyponatremia   People in Home sibling(s)   Do you expect to return to your current living situation? Yes   Do you have help at home or someone to help you manage your care at home? Yes   Who are your caregiver(s) and their phone number(s)? Stacia Tabares (Sister)  [phone]   Prior to hospitilization cognitive status: Alert/Oriented   Current cognitive status: Alert/Oriented   Walking or Climbing Stairs Difficulty no   Dressing/Bathing Difficulty no   Equipment Currently Used at Home none   Readmission within 30 days? Yes   Patient currently being followed by outpatient case management? No   Do you currently have service(s) that help you manage your care at home? No   Do you take prescription medications? Yes   Do you have prescription coverage? Yes   Do you have any problems affording any of your prescribed medications? No   Is the patient taking medications as prescribed? yes   Who is going to help you get home at discharge? Stacia Tabares (Sister)  [phone]   How do you get to doctors appointments? health plan transportation   Are you on dialysis? No   Do you take coumadin? No   Discharge Plan A Home with family   Discharge Plan B Home   DME Needed Upon Discharge  none   Discharge Plan discussed with: Sibling;Patient   Name(s) and Number(s) Stacia Tabares (Sister)  [phone]   Transition of Care Barriers None   Physical Activity   On average, how many  days per week do you engage in moderate to strenuous exercise (like a brisk walk)? 0 days   On average, how many minutes do you engage in exercise at this level? 0 min   Financial Resource Strain   How hard is it for you to pay for the very basics like food, housing, medical care, and heating? Not hard   Housing Stability   In the last 12 months, was there a time when you were not able to pay the mortgage or rent on time? N   At any time in the past 12 months, were you homeless or living in a shelter (including now)? N   Transportation Needs   Has the lack of transportation kept you from medical appointments, meetings, work or from getting things needed for daily living? No   Food Insecurity   Within the past 12 months, you worried that your food would run out before you got the money to buy more. Never true   Within the past 12 months, the food you bought just didn't last and you didn't have money to get more. Never true   Stress   Do you feel stress - tense, restless, nervous, or anxious, or unable to sleep at night because your mind is troubled all the time - these days? Not at all   Social Isolation   How often do you feel lonely or isolated from those around you?  Never   Alcohol Use   Q1: How often do you have a drink containing alcohol? Never   Q2: How many drinks containing alcohol do you have on a typical day when you are drinking? None   Q3: How often do you have six or more drinks on one occasion? Never   Utilities   In the past 12 months has the electric, gas, oil, or water company threatened to shut off services in your home? No   Health Literacy   How often do you need to have someone help you when you read instructions, pamphlets, or other written material from your doctor or pharmacy? Always   OTHER   Name(s) of People in Home Stacia Tabares (Sister)  [phone]     [facility] ([address])  Initial Discharge Assessment       Primary Care Provider: No, Primary Doctor    Admission  Diagnosis: Hyponatremia [E87.1]  Chest pain [R07.9]    Admission Date: 8/23/2024  Expected Discharge Date:     Transition of Care Barriers: (P) None    Payor: AET[employer] MANAGED MEDICARE / Plan: AETNA MEDICARE DUAL DSNP / Product Type: Medicare Advantage /     Extended Emergency Contact Information  Primary Emergency Contact: Stacia Tabares  Address: [address] [place] [address] [facility]  Home Phone: [phone]  Relation: Sister  Secondary Emergency Contact: Gail Tabares   [place]  Mobile Phone: [phone]  Relation: Relative    Discharge Plan A: (P) Home with family  Discharge Plan B: (P) Home      [id number] - [place], [place] - [address]  [address]  [facility] [address]  Phone: [phone] Fax: [phone]      Initial Assessment (most recent)       Adult Discharge Assessment - 08/24/24 1008          Discharge Assessment    Assessment Type Discharge Planning Assessment (P)      Confirmed/corrected address, phone number and insurance Yes (P)      Confirmed Demographics Correct on Facesheet (P)      Source of Information patient;family (P)      When was your last doctors appointment? 08/22/24 (P)      Communicated DELBERT with patient/caregiver Date not available/Unable to determine (P)      Reason For Admission Hyponatremia (P)      People in Home sibling(s) (P)      Do you expect to return to your current living situation? Yes (P)      Do you have help at home or someone to help you manage your care at home? Yes (P)      Who are your caregiver(s) and their phone number(s)? Stacia Tabares (Sister)  [phone] (P)      Prior to hospitilization cognitive status: Alert/Oriented (P)      Current cognitive status: Alert/Oriented (P)      Walking or Climbing Stairs Difficulty no (P)      Dressing/Bathing Difficulty no (P)      Equipment Currently Used at Home none (P)       Readmission within 30 days? Yes (P)      Patient currently being followed by outpatient case management? No (P)      Do you currently have service(s) that help you manage your care at home? No (P)      Do you take prescription medications? Yes (P)      Do you have prescription coverage? Yes (P)      Do you have any problems affording any of your prescribed medications? No (P)      Is the patient taking medications as prescribed? yes (P)      Who is going to help you get home at discharge? Stacia Tabares (Sister)  [phone] (P)      How do you get to doctors appointments? health plan transportation (P)      Are you on dialysis? No (P)      Do you take coumadin? No (P)      Discharge Plan A Home with family (P)      Discharge Plan B Home (P)      DME Needed Upon Discharge  none (P)      Discharge Plan discussed with: Sibling;Patient (P)      Name(s) and Number(s) Stacia Tabares (Sister)  [phone] (P)      Transition of Care Barriers None (P)         Physical Activity    On average, how many days per week do you engage in moderate to strenuous exercise (like a brisk walk)? 0 days (P)      On average, how many minutes do you engage in exercise at this level? 0 min (P)         Financial Resource Strain    How hard is it for you to pay for the very basics like food, housing, medical care, and heating? Not hard at all (P)         Housing Stability    In the last 12 months, was there a time when you were not able to pay the mortgage or rent on time? No (P)      At any time in the past 12 months, were you homeless or living in a shelter (including now)? No (P)         Transportation Needs    Has the lack of transportation kept you from medical appointments, meetings, work or from getting things needed for daily living? No (P)         Food Insecurity    Within the past 12 months, you worried that your food would run out before you got the money to buy more. Never true (P)      Within the past 12 months, the food you  bought just didn't last and you didn't have money to get more. Never true (P)         Stress    Do you feel stress - tense, restless, nervous, or anxious, or unable to sleep at night because your mind is troubled all the time - these days? Not at all (P)         Social Isolation    How often do you feel lonely or isolated from those around you?  Never (P)         Alcohol Use    Q1: How often do you have a drink containing alcohol? Never (P)      Q2: How many drinks containing alcohol do you have on a typical day when you are drinking? Patient does not drink (P)      Q3: How often do you have six or more drinks on one occasion? Never (P)         Utilities    In the past 12 months has the electric, gas, oil, or water company threatened to shut off services in your home? No (P)         Health Literacy    How often do you need to have someone help you when you read instructions, pamphlets, or other written material from your doctor or pharmacy? Always (P)         OTHER    Name(s) of People in Home Stacia Tabares (Sister)  [phone] (P)

## 2024-08-24 NOTE — ASSESSMENT & PLAN NOTE
Patient presented to ED following a primary care visit where his lab work revealed Na+ 125. Patient with hx of lung cancer and taking carbamezapine which could contribute to hyponatremia. Recently admitted with hyponatremia 8/10/24.      -Na 125--> 129  - Free water restriction.   - Trend Na Q6  - Consult placed for Nephrology. Appreciate recs which include: start Na tabs 1g BID  - Aim to correct 4-6 mmol/L over 24 hrs

## 2024-08-24 NOTE — PLAN OF CARE
Inpatient Upgrade Note    Bulmaro Tabares has warranted treatment spanning two or more midnights of hospital level care for the management of   64-year-old male with a past medical history of bipolar disorder, seizures, for which he takes carbamazepine, and recently diagnosed left lower lobe squamous cell carcinoma (06/2024) who presents with following a primary care visit where his labs revealed Hyponatremia, NA- 125. He has was recently admitted for hyponatremia on 8/10, and discharged on 8/12. He denies any other complaints at this time, and states he has been feeling better over the past few days. Patient denies chest pain, shortness of breath, nausea, vomiting, urinary symptoms, fever and chills. During previous admission, patient's NA corrected with free water restriction. [facility] consulted.  . He continues to require daily labs, further testing/imaging, monitoring of vital signs, medication adjustments, and further evaluation by consultants. His condition is also complicated by the following comorbidities:  metal illness  .

## 2024-08-24 NOTE — PLAN OF CARE
Plan of care reviewed with patient and family.     Problem: Adult Inpatient Plan of Care  Goal: Plan of Care Review  Outcome: Progressing  Goal: Absence of Hospital-Acquired Illness or Injury  Outcome: Progressing  Goal: Optimal Comfort and Wellbeing  Outcome: Progressing  Goal: Readiness for Transition of Care  Outcome: Progressing     Problem: Infection  Goal: Absence of Infection Signs and Symptoms  Outcome: Progressing     Problem: Electrolyte Imbalance  Goal: Electrolyte Balance  Outcome: Progressing     Problem: Fall Injury Risk  Goal: Absence of Fall and Fall-Related Injury  Outcome: Progressing

## 2024-08-24 NOTE — PROGRESS NOTES
REASON FOR CONSULTATION:     HPI:64 y.o. male with PMHx significant for bipolar disorder, seizure disorder, 50+ pack yr smoking history, newly diagnosed Squamous Cell Carcinoma of the LLL known to our group from a recent admission for hyponatremia.  He was discharged 8/12/2024 with Na 133.  His admit sodium was 122 and it appeared to be due to hypovolemic hyponatremia. Patient at risk for possible SIADH however laboratory studies studies not consistent with SIADH. Patient volume resuscitated with intravenous fluids.  Also considered was whether his home seizure medication carbamazepine may have caused his hyponatremia. This seems less likely as it corrected with volume resuscitation. Caregiver reports that his seizures have been difficult control without carbamazepine. We recommended he limit his free water intake to 500 cc a day but otherwise patient allowed to take in fluids with solute and okay to continue carbamazepine.     Today, he presented to ED after he was referred for an abnormal sodium on labs of 125.  Given his baseline intellectual challenges, it is not possible to ascertain whether he was able to stick to his free water restriction;     Interval history: No events overnight. I/O: 1872/1025. Pitcher of water on tray table!! He feels better today    REVIEW OF SYSTEMS:    Constitutional:  Positive for fatigue and unexpected weight change. Negative for activity change, appetite change, chills and fever.   HENT:  Negative for congestion, sore throat and trouble swallowing.    Eyes:  Negative for photophobia and visual disturbance.   Respiratory:  Negative for cough, chest tightness and shortness of breath.    Cardiovascular:  Negative for chest pain, palpitations and leg swelling.   Gastrointestinal:  Negative for abdominal pain, diarrhea and nausea.   Genitourinary:  Negative for dysuria, flank pain and hematuria.   Musculoskeletal:  Negative for back pain.   Neurological:  Negative for dizziness,  weakness and headaches.   Psychiatric/Behavioral:  Negative for confusion.      Past Medical History:   Diagnosis Date    Bipolar disorder     Schizoaffective disorder        No past surgical history on file.    Review of patient's allergies indicates:  No Known Allergies    Medications Prior to Admission   Medication Sig Dispense Refill Last Dose    albuterol (PROVENTIL/VENTOLIN HFA) 90 mcg/actuation inhaler Inhale 2 puffs into the lungs every 4 (four) hours as needed for Shortness of Breath.   Past Week    benztropine (COGENTIN) 1 MG tablet Take 1 tablet by mouth 2 (two) times daily.   8/23/2024    carBAMazepine (TEGRETOL) 200 mg tablet Take 1 tablet (200 mg total) by mouth every morning.   8/23/2024    carBAMazepine (TEGRETOL) 200 mg tablet Take 2 tablets (400 mg total) by mouth every evening.   8/23/2024    risperiDONE (RISPERDAL) 3 MG Tab Take 1 tablet by mouth 2 (two) times daily.   8/23/2024       Current Facility-Administered Medications   Medication Dose Route Frequency Provider Last Rate Last Admin    0.9%  NaCl infusion   Intravenous Continuous Srheya Chamorro  mL/hr at 08/24/24 0632 Rate Verify at 08/24/24 0632    acetaminophen tablet 650 mg  650 mg Oral Q8H PRN Val Asekw, NP        [START ON 8/25/2024] benztropine tablet 1 mg  1 mg Oral Before breakfast Jaqueline Babb DNP        benztropine tablet 1 mg  1 mg Oral Q24H Jaqueline Babb DNP        carBAMazepine tablet 200 mg  200 mg Oral QAM Val Askew NP   200 mg at 08/24/24 0630    carBAMazepine tablet 400 mg  400 mg Oral Q24H Jaqueline Babb DNP        enoxaparin injection 40 mg  40 mg Subcutaneous Daily Val Askew, NP   40 mg at 08/23/24 1742    melatonin tablet 6 mg  6 mg Oral Nightly PRN Val Askew NP        naloxone 0.4 mg/mL injection 0.02 mg  0.02 mg Intravenous PRN Val Askew NP        ondansetron injection 4 mg  4 mg Intravenous Q6H PRN Val Askew, NP         prochlorperazine injection Soln 5 mg  5 mg Intravenous Q6H PRN Val Askew NP        risperiDONE tablet 3 mg  3 mg Oral Q24H Jaqueline Babb DNP        risperiDONE tablet 3 mg  3 mg Oral Q24H Jaqueline Babb DNP        senna-docusate 8.6-50 mg per tablet 1 tablet  1 tablet Oral BID Val Askew, NP   1 tablet at 08/24/24 1017    sodium chloride 0.9% flush 10 mL  10 mL Intravenous Q8H PRN Val Askew NP           Social History     Socioeconomic History    Marital status: Single   Tobacco Use    Smoking status: Every Day     Current packs/day: 1.00     Types: Cigarettes   Substance and Sexual Activity    Alcohol use: No    Drug use: No     Social Determinants of Health     Financial Resource Strain: Low Risk  (8/24/2024)    Overall Financial Resource Strain (CARDIA)     Difficulty of Paying Living Expenses: Not hard at all   Food Insecurity: No Food Insecurity (8/24/2024)    Hunger Vital Sign     Worried About Running Out of Food in the Last Year: Never true     Ran Out of Food in the Last Year: Never true   Transportation Needs: No Transportation Needs (8/24/2024)    TRANSPORTATION NEEDS     Transportation : No   Physical Activity: Inactive (8/24/2024)    Exercise Vital Sign     Days of Exercise per Week: 0 days     Minutes of Exercise per Session: 0 min   Stress: No Stress Concern Present (8/24/2024)    [employer] - Occupational Stress Questionnaire     Feeling of Stress : Not at all   Housing Stability: Low Risk  (8/24/2024)    Housing Stability Vital Sign     Unable to Pay for Housing in the Last Year: No     Homeless in the Last Year: No       Family History   Problem Relation Name Age of Onset    Heart disease Father      Heart attack Father      Diabetes Sister      Seizures Sister      Hypertension Brother      Diabetes Brother      Asthma Mother         Temp:  [97.5 °F (36.4 °C)-99.3 °F (37.4 °C)] 97.5 °F (36.4 °C)  Pulse:  [56-79] 56  Resp:   [12-18] 16  SpO2:  [96 %-100 %] 99 %  BP: (117-156)/(67-96) 140/86      PHYSICAL EXAM:    GEN:  Alert, thin but not cachectic  HEENT:  NCAT, MARKO, No conjunctivitis, normal sclera, O/P clear, no oropharyngial lesions, no thrush, neck supple, No LAD, Nml JVD, no carotid bruits  CV:  RRR no MRG  Lungs:  CTAb, no rhonchi, wheeze, crackles, normal excursion  Abdomen: Obese, soft, NTND, no fluid wave, no HSM, NABS  Ext:  No CCE, normal DP pulses bilat  Neuro:  Non-Focal, EOMI, gait not assessed  Skin:  No rash, excoriation, petchiae    Labs/EKG/Radiology:  Reviewed    ASSESSMENT AND PLAN:    Hyponatremia, recurrent  -Urine Na 80; urine osms 333, both more consistent with SIADH.  Given mild hypovolemia, however, probably a mixed picture of hypovolemic hyponatremia from poor intake on chronic drug induced SIADH (carbazmazepine).    -given NS bolus 500 ml, then 100 ml per hour inially.  -Currently with appropriate rise in serum Na.  -continue free water restriction.  -Has responded to gentle IVF's overnight , now appears more euvolemic thus will stop IVFs..  -Given persistence would benefit from urea but is not available at [facility]  -Will start PO NaCl tabs 1g po bid.

## 2024-08-24 NOTE — PROGRESS NOTES
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 He has was recently admitted for hyponatremia on 8/10, and discharged on 8/12. He denies any other complaints at this time, and states he has been feeling better over the past few days. Patient denies chest pain, shortness of breath, nausea, vomiting, urinary symptoms, fever and chills. During previous admission, patient's NA corrected with free water restriction. [facility] consulted.     Per chart review, patient recently diagnosed with lung cancer and is followed at [facility] by Dr. Dempsey. He is following Dr. Dempsey for potential resection, and future chemotherapy and radion.       In ED Patient , other lab work unremarkable. Patient will be admitted to Hospital Medicine for further evaluation and management.     Overview/Hospital Course:  No notes on file    Interval Note: Mr Chamberlain is asymptomatic.  He reports the last time he was hyponatremic, he was weak and sluggish. He denies headache and weakness at this time. Corrected medication schedule. Discussed plan with his sister, Siobhan at bedside.  He has improvement in his sodium.  Will continue plan per nephrology recs.     Past Medical History:   Diagnosis Date    Bipolar disorder     Schizoaffective disorder        No past surgical history on file.    Review of patient's allergies indicates:  No Known Allergies    No  current facility-administered medications on file prior to encounter.     Current Outpatient Medications on File Prior to Encounter   Medication Sig    benztropine (COGENTIN) 1 MG tablet Take 1 tablet by mouth 2 (two) times daily.    carBAMazepine (TEGRETOL) 200 mg tablet Take 1 tablet (200 mg total) by mouth every morning.    carBAMazepine (TEGRETOL) 200 mg tablet Take 2 tablets (400 mg total) by mouth every evening.    risperiDONE (RISPERDAL) 3 MG Tab Take 1 tablet by mouth 2 (two) times daily.    acetaminophen (TYLENOL) 325 MG tablet Take 2 tablets (650 mg total) by mouth every 8 (eight) hours as needed for Pain.    albuterol (PROVENTIL/VENTOLIN HFA) 90 mcg/actuation inhaler Inhale 2 puffs into the lungs every 4 (four) hours as needed for Shortness of Breath.    ANORO ELLIPTA 62.5-25 mcg/actuation DsDv Inhale 1 puff into the lungs once daily.    aspirin (ECOTRIN) 81 MG EC tablet Take 1 tablet (81 mg total) by mouth once daily.    atorvastatin (LIPITOR) 40 MG tablet Take 1 tablet (40 mg total) by mouth once daily.     Family History       Problem Relation (Age of Onset)    Asthma Mother    Diabetes Sister, Brother    Heart attack Father    Heart disease Father    Hypertension Brother    Seizures Sister          Tobacco Use    Smoking status: Every Day     Current packs/day: 1.00     Types: Cigarettes    Smokeless tobacco: Not on file   Substance and Sexual Activity    Alcohol use: No    Drug use: No    Sexual activity: Not on file     Review of Systems   Constitutional:  Negative for activity change, appetite change, chills, fatigue, fever and unexpected weight change.   HENT:  Negative for congestion, sore throat and trouble swallowing.    Eyes:  Negative for photophobia and visual disturbance.   Respiratory:  Negative for cough, chest tightness and shortness of breath.    Cardiovascular:  Negative for chest pain, palpitations and leg swelling.   Gastrointestinal:  Negative for abdominal pain, diarrhea and  nausea.   Genitourinary:  Negative for dysuria, flank pain and hematuria.   Musculoskeletal:  Negative for back pain.   Neurological:  Negative for dizziness, weakness and headaches.   Psychiatric/Behavioral:  Negative for confusion.      Objective:     Vital Signs (Most Recent):  Temp: 98 °F (36.7 °C) (08/23/24 1735)  Pulse: 64 (08/23/24 1735)  Resp: 12 (08/23/24 1735)  BP: (!) 151/92 (08/23/24 1735)  SpO2: 96 % (08/23/24 1735) Vital Signs (24h Range):  Temp:  [98 °F (36.7 °C)-98.1 °F (36.7 °C)] 98 °F (36.7 °C)  Pulse:  [60-79] 64  Resp:  [12-18] 12  SpO2:  [96 %-100 %] 96 %  BP: (117-156)/(67-96) 151/92     Weight: 49.9 kg (110 lb)  Body mass index is 18.3 kg/m².     Physical Exam  Vitals reviewed.   Constitutional:       Appearance: Normal appearance. He is underweight.   HENT:      Head: Normocephalic.      Right Ear: Hearing normal.      Left Ear: Hearing normal.      Mouth/Throat:      Mouth: Mucous membranes are moist.      Pharynx: Oropharynx is clear.   Eyes:      General: Lids are normal. Vision grossly intact.   Cardiovascular:      Rate and Rhythm: Normal rate and regular rhythm.      Pulses: Normal pulses.      Heart sounds: Normal heart sounds.   Pulmonary:      Effort: Pulmonary effort is normal.      Breath sounds: Normal breath sounds.   Abdominal:      General: Abdomen is flat. Bowel sounds are normal.   Musculoskeletal:         General: Normal range of motion.      Cervical back: Normal range of motion.      Right lower leg: No edema.      Left lower leg: No edema.   Skin:     General: Skin is warm and dry.   Neurological:      Mental Status: He is alert and oriented to person, place, and time. Mental status is at baseline.   Psychiatric:         Attention and Perception: Attention normal.         Mood and Affect: Mood and affect normal.         Speech: Speech normal.                Significant Labs: All pertinent labs within the past 24 hours have been reviewed.  BMP:   Recent Labs   Lab  08/24/24  1151   GLU 94  94   *  129*   K 4.5  4.5   CL 97  97   CO2 26  26   BUN 8  8   CREATININE 0.8  0.8   CALCIUM 8.5*  8.5*       Significant Imaging: I have reviewed all pertinent imaging results/findings within the past 24 hours.      Assessment/Plan:      * Hyponatremia  Patient presented to ED following a primary care visit where his lab work revealed Na+ 125. Patient with hx of lung cancer and taking carbamezapine which could contribute to hyponatremia. Recently admitted with hyponatremia 8/10/24.      -Na 125--> 129  - Free water restriction.   - Trend Na Q6  - Consult placed for Nephrology. Appreciate recs which include: start Na tabs 1g BID  - Aim to correct 4-6 mmol/L over 24 hrs       Primary cancer of left lower lobe of lung  Recently diagnosed 6/2024 and followed Dr Dempsey. Has an appointment for follow Tuesday 8/27/24 to discuss treatment plan    Follow up with Dr Dempsey as scheduled      Bipolar disorder  Noted    Continue cogentin and Risperdal       Seizure disorder  Noted,  Pt does not report recent seizure activity.     - continue for now pending nephrology recommendations      VTE Risk Mitigation (From admission, onward)           Ordered     enoxaparin injection 40 mg  Daily         08/23/24 1541     IP VTE HIGH RISK PATIENT  Once         08/23/24 1541     Place sequential compression device  Until discontinued         08/23/24 1541                    Discharge Planning   DELBERT:      Code Status: Full Code   Is the patient medically ready for discharge?:     Reason for patient still in hospital (select all that apply): Patient trending condition, Treatment, and Consult recommendations  Discharge Plan A: Home with family                  Jaqueline Babb DNP  Department of Hospital Medicine   [facility] ([address])

## 2024-08-25 VITALS
HEART RATE: 72 BPM | TEMPERATURE: 98 F | WEIGHT: 110.69 LBS | SYSTOLIC BLOOD PRESSURE: 113 MMHG | OXYGEN SATURATION: 96 % | DIASTOLIC BLOOD PRESSURE: 79 MMHG | BODY MASS INDEX: 18.44 KG/M2 | RESPIRATION RATE: 18 BRPM | HEIGHT: 65 IN

## 2024-08-25 LAB
ANION GAP SERPL CALC-SCNC: 10 MMOL/L (ref 8–16)
ANION GAP SERPL CALC-SCNC: 8 MMOL/L (ref 8–16)
BASOPHILS # BLD AUTO: 0.03 K/UL (ref 0–0.2)
BASOPHILS NFR BLD: 0.6 % (ref 0–1.9)
BUN SERPL-MCNC: 10 MG/DL (ref 8–23)
BUN SERPL-MCNC: 9 MG/DL (ref 8–23)
CALCIUM SERPL-MCNC: 8.5 MG/DL (ref 8.7–10.5)
CALCIUM SERPL-MCNC: 8.9 MG/DL (ref 8.7–10.5)
CHLORIDE SERPL-SCNC: 100 MMOL/L (ref 95–110)
CHLORIDE SERPL-SCNC: 99 MMOL/L (ref 95–110)
CO2 SERPL-SCNC: 23 MMOL/L (ref 23–29)
CO2 SERPL-SCNC: 25 MMOL/L (ref 23–29)
CREAT SERPL-MCNC: 0.8 MG/DL (ref 0.5–1.4)
CREAT SERPL-MCNC: 0.9 MG/DL (ref 0.5–1.4)
DIFFERENTIAL METHOD BLD: ABNORMAL
EOSINOPHIL # BLD AUTO: 0.1 K/UL (ref 0–0.5)
EOSINOPHIL NFR BLD: 2.3 % (ref 0–8)
ERYTHROCYTE [DISTWIDTH] IN BLOOD BY AUTOMATED COUNT: 13.5 % (ref 11.5–14.5)
EST. GFR  (NO RACE VARIABLE): >60 ML/MIN/1.73 M^2
EST. GFR  (NO RACE VARIABLE): >60 ML/MIN/1.73 M^2
GLUCOSE SERPL-MCNC: 91 MG/DL (ref 70–110)
GLUCOSE SERPL-MCNC: 93 MG/DL (ref 70–110)
HCT VFR BLD AUTO: 34.6 % (ref 40–54)
HGB BLD-MCNC: 11.8 G/DL (ref 14–18)
IMM GRANULOCYTES # BLD AUTO: 0.02 K/UL (ref 0–0.04)
IMM GRANULOCYTES NFR BLD AUTO: 0.4 % (ref 0–0.5)
LYMPHOCYTES # BLD AUTO: 1.6 K/UL (ref 1–4.8)
LYMPHOCYTES NFR BLD: 29.4 % (ref 18–48)
MCH RBC QN AUTO: 30.7 PG (ref 27–31)
MCHC RBC AUTO-ENTMCNC: 34.1 G/DL (ref 32–36)
MCV RBC AUTO: 90 FL (ref 82–98)
MONOCYTES # BLD AUTO: 0.6 K/UL (ref 0.3–1)
MONOCYTES NFR BLD: 11.1 % (ref 4–15)
NEUTROPHILS # BLD AUTO: 3 K/UL (ref 1.8–7.7)
NEUTROPHILS NFR BLD: 56.2 % (ref 38–73)
NRBC BLD-RTO: 0 /100 WBC
PLATELET # BLD AUTO: 188 K/UL (ref 150–450)
PMV BLD AUTO: 9.2 FL (ref 9.2–12.9)
POTASSIUM SERPL-SCNC: 4.2 MMOL/L (ref 3.5–5.1)
POTASSIUM SERPL-SCNC: 4.3 MMOL/L (ref 3.5–5.1)
RBC # BLD AUTO: 3.84 M/UL (ref 4.6–6.2)
SODIUM SERPL-SCNC: 132 MMOL/L (ref 136–145)
SODIUM SERPL-SCNC: 133 MMOL/L (ref 136–145)
WBC # BLD AUTO: 5.3 K/UL (ref 3.9–12.7)

## 2024-08-25 PROCEDURE — 25000003 PHARM REV CODE 250: Performed by: NURSE PRACTITIONER

## 2024-08-25 PROCEDURE — 85025 COMPLETE CBC W/AUTO DIFF WBC: CPT | Performed by: NURSE PRACTITIONER

## 2024-08-25 PROCEDURE — 25000003 PHARM REV CODE 250: Performed by: INTERNAL MEDICINE

## 2024-08-25 PROCEDURE — 80048 BASIC METABOLIC PNL TOTAL CA: CPT | Performed by: INTERNAL MEDICINE

## 2024-08-25 RX ORDER — SODIUM CHLORIDE 1 G/1
1000 TABLET ORAL 2 TIMES DAILY
Qty: 60 TABLET | Refills: 0 | Status: SHIPPED | OUTPATIENT
Start: 2024-08-25 | End: 2024-08-25

## 2024-08-25 RX ORDER — SODIUM CHLORIDE 1 G/1
1000 TABLET ORAL 2 TIMES DAILY
Qty: 60 TABLET | Refills: 0 | Status: SHIPPED | OUTPATIENT
Start: 2024-08-25 | End: 2024-09-24

## 2024-08-25 RX ADMIN — DOCUSATE SODIUM AND SENNOSIDES 1 TABLET: 8.6; 5 TABLET, FILM COATED ORAL at 08:08

## 2024-08-25 RX ADMIN — SODIUM CHLORIDE 1000 MG: 1 TABLET ORAL at 08:08

## 2024-08-25 RX ADMIN — CARBAMAZEPINE 200 MG: 200 TABLET ORAL at 06:08

## 2024-08-25 RX ADMIN — BENZTROPINE MESYLATE 1 MG: 1 TABLET ORAL at 06:08

## 2024-08-25 NOTE — PLAN OF CARE
08/25/24 1407   Final Note   Assessment Type Final Discharge Note   Anticipated Discharge Disposition Home   Hospital Resources/Appts/Education Provided Provided patient/caregiver with written discharge plan information;Appointments scheduled and added to AVS   Post-Acute Status   Discharge Delays None known at this time     Pt states he lives independently at home.     Family to provide transportation home.    No DC needs from CM perspective.

## 2024-08-25 NOTE — PLAN OF CARE
Problem: Adult Inpatient Plan of Care  Goal: Plan of Care Review  Outcome: Progressing  Goal: Patient-Specific Goal (Individualized)  Outcome: Progressing  Goal: Absence of Hospital-Acquired Illness or Injury  Outcome: Progressing  Goal: Optimal Comfort and Wellbeing  Outcome: Progressing  Goal: Readiness for Transition of Care  Outcome: Progressing     Problem: Infection  Goal: Absence of Infection Signs and Symptoms  Outcome: Progressing     Problem: Electrolyte Imbalance  Goal: Electrolyte Balance  Outcome: Progressing     Problem: Fall Injury Risk  Goal: Absence of Fall and Fall-Related Injury  Outcome: Progressing       POC reviewed with patient and caregiver. Patient verbalized understanding. AAOX3. Patient is disoriented to situation. VSS. Call bell left within reach. Bed lowered and wheels locked. Patient free of falls and injury

## 2024-08-25 NOTE — DISCHARGE SUMMARY
[facility] ([address] Medicine  Discharge Summary      Patient Name: Bulmaro Tabares  MRN: [id number]  HOLLY: [id number]  Patient Class: IP- Inpatient  Admission Date: 8/23/2024  Hospital Length of Stay: 1 days  Discharge Date and Time:  08/25/2024 1:38 PM  Attending Physician: Becky Clemente MD   Discharging Provider: Jaqueline Babb DNP  Primary Care Provider: Scarlett, Primary Doctor    Primary Care Team: Networked reference to record PCT     HPI:   Bulmaro Tabares is a 64-year-old male with a past medical history of bipolar disorder, seizures, for which he takes carbamazepine, and recently diagnosed left lower lobe squamous cell carcinoma (06/2024) who presents with following a primary care visit where his labs revealed Hyponatremia, NA- 125. He has was recently admitted for hyponatremia on 8/10, and discharged on 8/12. He denies any other complaints at this time, and states he has been feeling better over the past few days. Patient denies chest pain, shortness of breath, nausea, vomiting, urinary symptoms, fever and chills. During previous admission, patient's NA corrected with free water restriction. [facility] consulted.     Per chart review, patient recently diagnosed with lung cancer and is followed at [facility] by Dr. Dempsey. He is following Dr. Dempsey for potential resection, and future chemotherapy and radion.       In ED Patient , other lab work unremarkable. Patient will be admitted to Hospital Medicine for further evaluation and management.     * No surgery found *      Hospital Course:   Mr Chamberlain was treated with normal saline IVF and sodium chloride tabs for hyponatremia. It is mixed form hypovolemia and SIADH from his Carbamezapine. Mr Chamberlain remained asymptomatic during his admission and his sodium level continued to increase. His sister and brother at bedside were updated on plan of care. Mr Chamberlain will be discharged home, continue sodium tabs, and follow up with PCP.      Goals of Care  Treatment Preferences:  Code Status: Full Code      SDOH Screening:  The patient was screened for utility difficulties, food insecurity, transport difficulties, housing insecurity, and interpersonal safety and there were no concerns identified this admission.     Consults:   Consults (From admission, onward)          Status Ordering Provider     Inpatient consult to [facility]  Once        Provider:  Jossue Her NP    Acknowledged MARGARET PARRA            Neuro  Seizure disorder  Noted,  Pt does not report recent seizure activity.     - continue carbamazepine; no changes to medications, as his seizures are difficult to control     Endocrine  * Hyponatremia  Patient presented to ED following a primary care visit where his lab work revealed Na+ 125. Patient with hx of lung cancer and taking carbamezapine which could contribute to hyponatremia. Recently admitted with hyponatremia 8/10/24.      -Na 125--> 129  - Free water restriction.   - Trend Na Q6  - Consult placed for Nephrology. Appreciate recs which include: start Na tabs 1g BID  - Aim to correct 4-6 mmol/L over 24 hrs         Final Active Diagnoses:    Diagnosis Date Noted POA    PRINCIPAL PROBLEM:  Hyponatremia [E87.1] 08/10/2024 Yes    Primary cancer of left lower lobe of lung [C34.32] 06/26/2024 Yes    Seizure disorder [G40.909] 06/15/2015 Yes    Bipolar disorder [F31.9] 06/15/2015 Yes      Problems Resolved During this Admission:       Discharged Condition: good    Disposition: Home or Self Care    Follow Up:   Follow-up Information       No, Primary Doctor. Schedule an appointment as soon as possible for a visit in 1 month(s).    Why: Make a follow up appointment with your primary care physician. If you do not have a primary care physician, a referal was placed and [facility] will contact you or you may contact [facility] to set up.                         Patient Instructions:      Ambulatory referral/consult to Internal Medicine   Standing  Status: Future   Referral Priority: Routine Referral Type: Consultation   Referral Reason: Specialty Services Required   Requested Specialty: Internal Medicine   Number of Visits Requested: 1     Diet Adult Regular     Notify your health care provider if you experience any of the following:  temperature >100.4     Notify your health care provider if you experience any of the following:  persistent dizziness, light-headedness, or visual disturbances     Notify your health care provider if you experience any of the following:  increased confusion or weakness     Activity as tolerated       Significant Diagnostic Studies: N/A    Pending Diagnostic Studies:       None           Medications:  Reconciled Home Medications:      Medication List        START taking these medications      sodium chloride 1,000 mg Tbso oral tablet  Take 1 tablet (1,000 mg total) by mouth 2 (two) times daily.            CONTINUE taking these medications      albuterol 90 mcg/actuation inhaler  Commonly known as: PROVENTIL/VENTOLIN HFA  Inhale 2 puffs into the lungs every 4 (four) hours as needed for Shortness of Breath.     benztropine 1 MG tablet  Commonly known as: COGENTIN  Take 1 tablet by mouth 2 (two) times daily.     * carBAMazepine 200 mg tablet  Commonly known as: TEGRETOL  Take 1 tablet (200 mg total) by mouth every morning.     * carBAMazepine 200 mg tablet  Commonly known as: TEGRETOL  Take 2 tablets (400 mg total) by mouth every evening.     risperiDONE 3 MG Tab  Commonly known as: RISPERDAL  Take 1 tablet by mouth 2 (two) times daily.           * This list has 2 medication(s) that are the same as other medications prescribed for you. Read the directions carefully, and ask your doctor or other care provider to review them with you.                  Indwelling Lines/Drains at time of discharge:   Lines/Drains/Airways       None                   Time spent on the discharge of patient: 30 minutes         Jaqueline Babb,  ELIZABETH  Department of Hospital Medicine  [facility] ([address])

## 2024-08-25 NOTE — ASSESSMENT & PLAN NOTE
Noted,  Pt does not report recent seizure activity.     - continue carbamazepine; no changes to medications, as his seizures are difficult to control

## 2024-08-25 NOTE — PROGRESS NOTES
REASON FOR CONSULTATION:     HPI:64 y.o. male with PMHx significant for bipolar disorder, seizure disorder, 50+ pack yr smoking history, newly diagnosed Squamous Cell Carcinoma of the LLL known to our group from a recent admission for hyponatremia.  He was discharged 8/12/2024 with Na 133.  His admit sodium was 122 and it appeared to be due to hypovolemic hyponatremia. Patient at risk for possible SIADH however laboratory studies studies not consistent with SIADH. Patient volume resuscitated with intravenous fluids.  Also considered was whether his home seizure medication carbamazepine may have caused his hyponatremia. This seems less likely as it corrected with volume resuscitation. Caregiver reports that his seizures have been difficult control without carbamazepine. We recommended he limit his free water intake to 500 cc a day but otherwise patient allowed to take in fluids with solute and okay to continue carbamazepine.     Today, he presented to ED after he was referred for an abnormal sodium on labs of 125.  Given his baseline intellectual challenges, it is not possible to ascertain whether he was able to stick to his free water restriction;     Interval history: No events overnight. I/O: 756/2500.  He feels better today. Sister and brother at bedside.        Past Medical History:   Diagnosis Date    Bipolar disorder     Schizoaffective disorder        No past surgical history on file.    Review of patient's allergies indicates:  No Known Allergies    Medications Prior to Admission   Medication Sig Dispense Refill Last Dose    albuterol (PROVENTIL/VENTOLIN HFA) 90 mcg/actuation inhaler Inhale 2 puffs into the lungs every 4 (four) hours as needed for Shortness of Breath.   Past Week    benztropine (COGENTIN) 1 MG tablet Take 1 tablet by mouth 2 (two) times daily.   8/23/2024    carBAMazepine (TEGRETOL) 200 mg tablet Take 1 tablet (200 mg total) by mouth every morning.   8/23/2024    carBAMazepine (TEGRETOL) 200  mg tablet Take 2 tablets (400 mg total) by mouth every evening.   8/23/2024    risperiDONE (RISPERDAL) 3 MG Tab Take 1 tablet by mouth 2 (two) times daily.   8/23/2024       Current Facility-Administered Medications   Medication Dose Route Frequency Provider Last Rate Last Admin    acetaminophen tablet 650 mg  650 mg Oral Q8H PRN Val Askew, JUAN        benztropine tablet 1 mg  1 mg Oral Before breakfast Jaqueline Babb DNP   1 mg at 08/25/24 0634    benztropine tablet 1 mg  1 mg Oral Q24H Jaqueline Babb DNP   1 mg at 08/24/24 1559    carBAMazepine tablet 200 mg  200 mg Oral QAM Val Askew, NP   200 mg at 08/25/24 0634    carBAMazepine tablet 400 mg  400 mg Oral Q24H Jaqueline Babb, DNP   400 mg at 08/24/24 1558    enoxaparin injection 40 mg  40 mg Subcutaneous Daily Val Askew, NP   40 mg at 08/24/24 1605    melatonin tablet 6 mg  6 mg Oral Nightly PRN Val Askew, NP        naloxone 0.4 mg/mL injection 0.02 mg  0.02 mg Intravenous PRN Val Askew, JUAN        ondansetron injection 4 mg  4 mg Intravenous Q6H PRN Val Askew, JUAN        prochlorperazine injection Soln 5 mg  5 mg Intravenous Q6H PRN Val Askew, NP        risperiDONE tablet 3 mg  3 mg Oral Q24H Jaqueline Babb DNP   3 mg at 08/24/24 1558    risperiDONE tablet 3 mg  3 mg Oral Q24H Jaqueline Babb DNP   3 mg at 08/24/24 2026    senna-docusate 8.6-50 mg per tablet 1 tablet  1 tablet Oral BID Val Askew, NP   1 tablet at 08/25/24 0801    sodium chloride 0.9% flush 10 mL  10 mL Intravenous Q8H PRN Val Askew, JUAN        sodium chloride oral tablet 1,000 mg  1,000 mg Oral BID Fatou Panda MD   1,000 mg at 08/25/24 0801       Social History     Socioeconomic History    Marital status: Single   Tobacco Use    Smoking status: Every Day     Current packs/day: 1.00     Types: Cigarettes   Substance and Sexual Activity    Alcohol use: No    Drug use: No     Social  Determinants of Health     Financial Resource Strain: Low Risk  (8/24/2024)    Overall Financial Resource Strain (CARDIA)     Difficulty of Paying Living Expenses: Not hard at all   Food Insecurity: No Food Insecurity (8/24/2024)    Hunger Vital Sign     Worried About Running Out of Food in the Last Year: Never true     Ran Out of Food in the Last Year: Never true   Transportation Needs: No Transportation Needs (8/24/2024)    TRANSPORTATION NEEDS     Transportation : No   Physical Activity: Inactive (8/24/2024)    Exercise Vital Sign     Days of Exercise per Week: 0 days     Minutes of Exercise per Session: 0 min   Stress: No Stress Concern Present (8/24/2024)    [employer] - Occupational Stress Questionnaire     Feeling of Stress : Not at all   Housing Stability: Low Risk  (8/24/2024)    Housing Stability Vital Sign     Unable to Pay for Housing in the Last Year: No     Homeless in the Last Year: No       Family History   Problem Relation Name Age of Onset    Heart disease Father      Heart attack Father      Diabetes Sister      Seizures Sister      Hypertension Brother      Diabetes Brother      Asthma Mother         Temp:  [97.2 °F (36.2 °C)-99.3 °F (37.4 °C)] 97.2 °F (36.2 °C)  Pulse:  [64-76] 64  Resp:  [15-21] 18  SpO2:  [96 %-99 %] 97 %  BP: (123-157)/(71-90) 123/76      PHYSICAL EXAM:    GEN:  Alert, thin but not cachectic  HEENT:  NCAT, MARKO, No conjunctivitis, normal sclera, O/P clear, no oropharyngial lesions, no thrush, neck supple, No LAD, Nml JVD, no carotid bruits  CV:  RRR no MRG  Lungs:  CTAb, no rhonchi, wheeze, crackles, normal excursion  Abdomen: Obese, soft, NTND, no fluid wave, no HSM, NABS  Ext:  No CCE, normal DP pulses bilat  Neuro:  Non-Focal, EOMI, gait not assessed  Skin:  No rash, excoriation, petchiae    Labs/EKG/Radiology:  Reviewed    ASSESSMENT AND PLAN:    Hyponatremia, 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  Given mild hypovolemia, however, probably a mixed picture of hypovolemic hyponatremia from poor intake on chronic drug induced SIADH (carbazmazepine).    -given NS bolus 500 ml, then 100 ml per hour inially.  -Currently with appropriate rise in serum Na.  -continue free water restriction.  -Has responded to gentle IVF's overnight , now appears more euvolemic thus will stop IVFs..  -Given persistence would benefit from urea but is not available at [facility]  -Started PO NaCl tabs 1g po bid 8/24/24  -Na 132 today.  -Needs much better fluid intake-stressed to patient and discussed with pts sister who is caregiver amount per day as well as need for NaCl tabs and plan for FU.  -Can be discharged home with F/U with PCP and if needed can increase NaCl to 2g bid as long as no edema and BP fairly well controlled. Otherwise will have to obtain Urea as outpt.

## 2024-12-11 PROCEDURE — 96360 HYDRATION IV INFUSION INIT: CPT

## 2024-12-11 PROCEDURE — 96361 HYDRATE IV INFUSION ADD-ON: CPT

## 2024-12-11 PROCEDURE — 99285 EMERGENCY DEPT VISIT HI MDM: CPT | Mod: 25

## 2024-12-12 ENCOUNTER — HOSPITAL ENCOUNTER (EMERGENCY)
Facility: OTHER | Age: 64
Discharge: HOME | End: 2024-12-12
Attending: EMERGENCY MEDICINE
Payer: MEDICARE

## 2024-12-12 VITALS
HEIGHT: 65 IN | TEMPERATURE: 98 F | OXYGEN SATURATION: 100 % | RESPIRATION RATE: 23 BRPM | HEART RATE: 71 BPM | DIASTOLIC BLOOD PRESSURE: 90 MMHG | SYSTOLIC BLOOD PRESSURE: 142 MMHG | BODY MASS INDEX: 18.33 KG/M2 | WEIGHT: 110 LBS

## 2024-12-12 DIAGNOSIS — E87.1 HYPONATREMIA: Primary | ICD-10-CM

## 2024-12-12 DIAGNOSIS — R06.00 DYSPNEA: ICD-10-CM

## 2024-12-12 LAB
ANION GAP SERPL CALC-SCNC: 11 MMOL/L (ref 8–16)
BASOPHILS # BLD AUTO: 0.02 K/UL (ref 0–0.2)
BASOPHILS NFR BLD: 0.5 % (ref 0–1.9)
BNP SERPL-MCNC: 17 PG/ML (ref 0–99)
BUN SERPL-MCNC: 9 MG/DL (ref 8–23)
CALCIUM SERPL-MCNC: 8.8 MG/DL (ref 8.7–10.5)
CHLORIDE SERPL-SCNC: 93 MMOL/L (ref 95–110)
CO2 SERPL-SCNC: 26 MMOL/L (ref 23–29)
CREAT SERPL-MCNC: 1 MG/DL (ref 0.5–1.4)
DIFFERENTIAL METHOD BLD: ABNORMAL
EOSINOPHIL # BLD AUTO: 0 K/UL (ref 0–0.5)
EOSINOPHIL NFR BLD: 0 % (ref 0–8)
ERYTHROCYTE [DISTWIDTH] IN BLOOD BY AUTOMATED COUNT: 14.5 % (ref 11.5–14.5)
EST. GFR  (NO RACE VARIABLE): >60 ML/MIN/1.73 M^2
EXTRA BLUE TOP RAINBOW: NORMAL
GLUCOSE SERPL-MCNC: 97 MG/DL (ref 70–110)
HCT VFR BLD AUTO: 31.3 % (ref 40–54)
HGB BLD-MCNC: 10.9 G/DL (ref 14–18)
IMM GRANULOCYTES # BLD AUTO: 0.03 K/UL (ref 0–0.04)
IMM GRANULOCYTES NFR BLD AUTO: 0.7 % (ref 0–0.5)
LYMPHOCYTES # BLD AUTO: 0.6 K/UL (ref 1–4.8)
LYMPHOCYTES NFR BLD: 13.5 % (ref 18–48)
MCH RBC QN AUTO: 32.9 PG (ref 27–31)
MCHC RBC AUTO-ENTMCNC: 34.8 G/DL (ref 32–36)
MCV RBC AUTO: 95 FL (ref 82–98)
MONOCYTES # BLD AUTO: 0.6 K/UL (ref 0.3–1)
MONOCYTES NFR BLD: 12.8 % (ref 4–15)
NEUTROPHILS # BLD AUTO: 3.2 K/UL (ref 1.8–7.7)
NEUTROPHILS NFR BLD: 72.5 % (ref 38–73)
NRBC BLD-RTO: 0 /100 WBC
PH SMN: 7.4 [PH] (ref 7.35–7.45)
PLATELET # BLD AUTO: 194 K/UL (ref 150–450)
PMV BLD AUTO: 9.3 FL (ref 9.2–12.9)
POC IONIZED CALCIUM: 1.17 MMOL/L (ref 1.06–1.42)
POTASSIUM BLD-SCNC: 4.4 MMOL/L (ref 3.5–5.1)
POTASSIUM SERPL-SCNC: 4 MMOL/L (ref 3.5–5.1)
RBC # BLD AUTO: 3.31 M/UL (ref 4.6–6.2)
SAMPLE: ABNORMAL
SODIUM BLD-SCNC: 130 MMOL/L (ref 136–145)
SODIUM SERPL-SCNC: 130 MMOL/L (ref 136–145)
WBC # BLD AUTO: 4.36 K/UL (ref 3.9–12.7)

## 2024-12-12 PROCEDURE — 80048 BASIC METABOLIC PNL TOTAL CA: CPT | Performed by: EMERGENCY MEDICINE

## 2024-12-12 PROCEDURE — 83880 ASSAY OF NATRIURETIC PEPTIDE: CPT | Performed by: EMERGENCY MEDICINE

## 2024-12-12 PROCEDURE — 25000003 PHARM REV CODE 250: Performed by: EMERGENCY MEDICINE

## 2024-12-12 PROCEDURE — 85025 COMPLETE CBC W/AUTO DIFF WBC: CPT | Performed by: EMERGENCY MEDICINE

## 2024-12-12 RX ADMIN — SODIUM CHLORIDE 500 ML: 0.9 INJECTION, SOLUTION INTRAVENOUS at 03:12

## 2024-12-12 NOTE — ED PROVIDER NOTES
Encounter Date: 2024    SCRIBE #1 NOTE: I, Montserrat Frey, am scribing for, and in the presence of,  Merna Acosta MD. I have scribed the following portions of the note - Other sections scribed: HPI, ROS, PE.       History     Chief Complaint   Patient presents with    Shortness of Breath     Pt complains of SOB that started abound 2100 when he was laying down to go to bed. Hx of lung CA currently getting chemo and radiation.     64 year old male with metastatic lung cancer currently undergoing chemotherapy presents to ED with orthopnea onset 4 hours ago when he tried to go to bed. Patient denies any SOB present right now. Symptoms not presents with sitting up or exertion. Patient notes feeling okay throughout yesterday. Denies nay recent sickness. Denies any fever, chills, cough, congestion, chest pain, abdominal pain, nausea, vomiting, dizziness or lightheadedness. Sister at bed side, details on more history. Patient did start a new medication on 2024, but only known side effects is diarrhea. Per chart review, prior hospitalization noted on 2024 and 08/10/2024 for hyponatremia secondary to chemotherapy and radiation.    The history is provided by the patient and a relative. No  was used.     Review of patient's allergies indicates:  No Known Allergies  Past Medical History:   Diagnosis Date    Bipolar disorder     Schizoaffective disorder      History reviewed. No pertinent surgical history.  Family History   Problem Relation Name Age of Onset    Heart disease Father      Heart attack Father      Diabetes Sister      Seizures Sister      Hypertension Brother      Diabetes Brother      Asthma Mother       Social History     Tobacco Use    Smoking status: Former     Current packs/day: 0.00     Types: Cigarettes     Quit date: 6/15/2024     Years since quittin.4   Substance Use Topics    Alcohol use: No    Drug use: No     Review of Systems   Constitutional:  Negative for  chills and fever.   HENT:  Negative for congestion and rhinorrhea.    Respiratory:  Positive for shortness of breath (present with laying down). Negative for chest tightness.    Cardiovascular:  Negative for chest pain and palpitations.   Gastrointestinal:  Negative for abdominal pain, diarrhea, nausea and vomiting.   Genitourinary:  Negative for dysuria and flank pain.   Musculoskeletal:  Negative for back pain and neck pain.   Skin:  Negative for color change and wound.   Neurological:  Negative for dizziness and headaches.       Physical Exam     Initial Vitals [12/11/24 2320]   BP Pulse Resp Temp SpO2   (!) 153/81 78 18 98 °F (36.7 °C) 98 %      MAP       --         Physical Exam    Constitutional: He appears well-developed and well-nourished. He does not have a sickly appearance. No distress.   HENT:   Head: Normocephalic and atraumatic.   Eyes: Conjunctivae, EOM and lids are normal. Pupils are equal, round, and reactive to light. Right eye exhibits no discharge. Left eye exhibits no discharge. Right conjunctiva is not injected. Right conjunctiva has no hemorrhage. Left conjunctiva is not injected. Left conjunctiva has no hemorrhage. No scleral icterus.   Neck: Phonation normal. No stridor present. No tracheal deviation present. No JVD present.   Normal range of motion.  Cardiovascular:  Normal rate, regular rhythm and normal heart sounds.     Exam reveals no friction rub.       No murmur heard.  Pulses:       Radial pulses are 2+ on the right side and 2+ on the left side.        Dorsalis pedis pulses are 2+ on the right side and 2+ on the left side.   Pulmonary/Chest: Breath sounds normal. No respiratory distress. He has no wheezes. He has no rhonchi. He has no rales.   Musculoskeletal:         General: No tenderness or edema.      Cervical back: Normal range of motion.     Neurological: He is alert and oriented to person, place, and time. He has normal strength. GCS eye subscore is 4. GCS verbal subscore is  5. GCS motor subscore is 6.   Skin: Skin is warm.   Psychiatric: He has a normal mood and affect. His speech is normal and behavior is normal. Judgment and thought content normal. Cognition and memory are normal.         ED Course   Procedures  Labs Reviewed   CBC W/ AUTO DIFFERENTIAL - Abnormal       Result Value    WBC 4.36      RBC 3.31 (*)     Hemoglobin 10.9 (*)     Hematocrit 31.3 (*)     MCV 95      MCH 32.9 (*)     MCHC 34.8      RDW 14.5      Platelets 194      MPV 9.3      Immature Granulocytes 0.7 (*)     Gran # (ANC) 3.2      Immature Grans (Abs) 0.03      Lymph # 0.6 (*)     Mono # 0.6      Eos # 0.0      Baso # 0.02      nRBC 0      Gran % 72.5      Lymph % 13.5 (*)     Mono % 12.8      Eosinophil % 0.0      Basophil % 0.5      Differential Method Automated     BASIC METABOLIC PANEL - Abnormal    Sodium 130 (*)     Potassium 4.0      Chloride 93 (*)     CO2 26      Glucose 97      BUN 9      Creatinine 1.0      Calcium 8.8      Anion Gap 11      eGFR >60     ISTAT PROCEDURE - Abnormal    POC PH 7.400      POC Sodium 130 (*)     POC Potassium 4.4      POC Ionized Calcium 1.17      Sample VENOUS     B-TYPE NATRIURETIC PEPTIDE    BNP 17     BLUE-TOP TUBE    Extra Blue Top Clay Extra            Imaging Results              X-Ray Chest AP Portable (Final result)  Result time 12/12/24 01:49:19      Final result by Benjie Mchugh MD (12/12/24 01:49:19)                   Impression:      No acute cardiopulmonary process identified.      Electronically signed by: Benjie Mchugh MD  Date:    12/12/2024  Time:    01:49               Narrative:    EXAMINATION:  XR CHEST AP PORTABLE    CLINICAL HISTORY:  Dyspnea, unspecified    TECHNIQUE:  Single frontal view of the chest was performed.    COMPARISON:  08/10/2024.    FINDINGS:  Cardiac silhouette is normal in size.  Lungs are symmetrically expanded.  No evidence of focal consolidative process, pneumothorax, or significant pleural effusion.  No acute osseous  abnormality identified.                                       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  The patient reports otherwise feeling well and his sister denies any recent illness.  Triage vital signs significant only for mildly elevated blood pressure but otherwise within normal limits.  On exam patient appears in no distress with clear lungs and no exam findings concerning for volume overload.  Will obtain chest x-ray as well as lab work including CBC, BNP, and BNP.    While in the ED I have gradually lower the head of the bed 2 where he has now been laying flat resting comfortably in no distress.  He has had no recurrence of dyspnea while in the emergency department and has developed no other symptoms.    BNP is normal.  Chest x-ray shows no evidence of volume overload.  Anemia stable.  No leukocytosis.  BNP significant for hyponatremia with a sodium of 130.  The patient has a history of hyponatremia for which she was admitted twice earlier this year.  Hyponatremia has been attributed to combination of hypovolemia and SIADH.  Per chart review, the patient has had lab work checked him multiple times over the past month in his sodium has ranged from 129-138.  Given that he is asymptomatic from the hyponatremia, I do not feel any emergent correction is warranted.  He was given a small fluid bolus.  His sister was instructed to notify his oncologist who is currently managing his care so that they can repeat labs as necessary.  ED return precautions reviewed.  The patient will be discharged at this time stable condition with his sister.      Amount and/or Complexity of Data Reviewed  Labs: ordered. Decision-making details documented in ED  Course.  Radiology: ordered. Decision-making details documented in ED Course.            Scribe Attestation:   Scribe #1: I performed the above scribed service and the documentation accurately describes the services I performed. I attest to the accuracy of the note.                           Physician Attestation for Scribe: I, Merna Acosta MD, reviewed documentation as scribed in my presence, which is both accurate and complete.      Clinical Impression:  Final diagnoses:  [R06.00] Dyspnea  [E87.1] Hyponatremia (Primary)          ED Disposition Condition    Discharge Stable          ED Prescriptions    None       Follow-up Information    None          Merna Acosta MD  12/12/24 0790

## 2024-12-12 NOTE — ED TRIAGE NOTES
Pt to ED this morning for SOB w/out any dizziness, CP, NV, only SOB and only when lying flat. Cooperative. Sister at bedside. States never experienced this problem before. Cancer treatment Non-chemo on 12/15.. Pt has lung CA lower left lung.

## 2025-01-16 ENCOUNTER — HOSPITAL ENCOUNTER (INPATIENT)
Facility: OTHER | Age: 65
LOS: 2 days | Discharge: HOME HEALTH | DRG: 644 | End: 2025-01-18
Attending: EMERGENCY MEDICINE | Admitting: HOSPITALIST
Payer: MEDICARE

## 2025-01-16 DIAGNOSIS — R53.81 DEBILITY: ICD-10-CM

## 2025-01-16 DIAGNOSIS — I95.1 ORTHOSTATIC SYNCOPE: Primary | ICD-10-CM

## 2025-01-16 DIAGNOSIS — R55 SYNCOPE: ICD-10-CM

## 2025-01-16 DIAGNOSIS — E87.1 HYPONATREMIA: ICD-10-CM

## 2025-01-16 DIAGNOSIS — R42 DIZZINESS: ICD-10-CM

## 2025-01-16 DIAGNOSIS — G40.909 SEIZURE DISORDER: ICD-10-CM

## 2025-01-16 DIAGNOSIS — R07.9 CHEST PAIN: ICD-10-CM

## 2025-01-16 PROBLEM — E44.0 MALNUTRITION OF MODERATE DEGREE: Status: ACTIVE | Noted: 2025-01-16

## 2025-01-16 LAB
ALBUMIN SERPL BCP-MCNC: 2.4 G/DL (ref 3.5–5.2)
ALBUMIN SERPL BCP-MCNC: 2.7 G/DL (ref 3.5–5.2)
ALP SERPL-CCNC: 94 U/L (ref 40–150)
ALT SERPL W/O P-5'-P-CCNC: 16 U/L (ref 10–44)
ANION GAP SERPL CALC-SCNC: 11 MMOL/L (ref 8–16)
ANION GAP SERPL CALC-SCNC: 12 MMOL/L (ref 8–16)
ANION GAP SERPL CALC-SCNC: 9 MMOL/L (ref 8–16)
AORTIC ROOT ANNULUS: 2.36 CM
ASCENDING AORTA: 2.67 CM
AST SERPL-CCNC: 23 U/L (ref 10–40)
BASOPHILS # BLD AUTO: 0.02 K/UL (ref 0–0.2)
BASOPHILS NFR BLD: 0.4 % (ref 0–1.9)
BILIRUB SERPL-MCNC: 0.2 MG/DL (ref 0.1–1)
BILIRUB UR QL STRIP: NEGATIVE
BSA FOR ECHO PROCEDURE: 1.51 M2
BUN SERPL-MCNC: 7 MG/DL (ref 8–23)
BUN SERPL-MCNC: 8 MG/DL (ref 8–23)
BUN SERPL-MCNC: 8 MG/DL (ref 8–23)
CALCIUM SERPL-MCNC: 8.1 MG/DL (ref 8.7–10.5)
CALCIUM SERPL-MCNC: 8.4 MG/DL (ref 8.7–10.5)
CALCIUM SERPL-MCNC: 9 MG/DL (ref 8.7–10.5)
CHLORIDE SERPL-SCNC: 93 MMOL/L (ref 95–110)
CHLORIDE SERPL-SCNC: 96 MMOL/L (ref 95–110)
CHLORIDE SERPL-SCNC: 98 MMOL/L (ref 95–110)
CLARITY UR: CLEAR
CO2 SERPL-SCNC: 20 MMOL/L (ref 23–29)
CO2 SERPL-SCNC: 21 MMOL/L (ref 23–29)
CO2 SERPL-SCNC: 25 MMOL/L (ref 23–29)
COLOR UR: YELLOW
CREAT SERPL-MCNC: 0.8 MG/DL (ref 0.5–1.4)
CREAT SERPL-MCNC: 0.8 MG/DL (ref 0.5–1.4)
CREAT SERPL-MCNC: 0.9 MG/DL (ref 0.5–1.4)
CV ECHO LV RWT: 0.34 CM
DIFFERENTIAL METHOD BLD: ABNORMAL
DOP CALC LVOT AREA: 2.5 CM2
DOP CALC LVOT DIAMETER: 1.8 CM
DOP CALC LVOT PEAK VEL: 0.6 M/S
DOP CALC LVOT STROKE VOLUME: 20.9 CM3
DOP CALCLVOT PEAK VEL VTI: 8.2 CM
E WAVE DECELERATION TIME: 157 MSEC
E/A RATIO: 1.13
E/E' RATIO: 7 M/S
ECHO LV POSTERIOR WALL: 0.7 CM (ref 0.6–1.1)
EOSINOPHIL # BLD AUTO: 0 K/UL (ref 0–0.5)
EOSINOPHIL NFR BLD: 0 % (ref 0–8)
ERYTHROCYTE [DISTWIDTH] IN BLOOD BY AUTOMATED COUNT: 12.9 % (ref 11.5–14.5)
EST. GFR  (NO RACE VARIABLE): >60 ML/MIN/1.73 M^2
FRACTIONAL SHORTENING: 43.9 % (ref 28–44)
GLUCOSE SERPL-MCNC: 100 MG/DL (ref 70–110)
GLUCOSE SERPL-MCNC: 91 MG/DL (ref 70–110)
GLUCOSE SERPL-MCNC: 97 MG/DL (ref 70–110)
GLUCOSE UR QL STRIP: NEGATIVE
HCT VFR BLD AUTO: 31.1 % (ref 40–54)
HCV AB SERPL QL IA: NEGATIVE
HGB BLD-MCNC: 10.9 G/DL (ref 14–18)
HGB UR QL STRIP: ABNORMAL
HIV 1+2 AB+HIV1 P24 AG SERPL QL IA: NEGATIVE
IMM GRANULOCYTES # BLD AUTO: 0.03 K/UL (ref 0–0.04)
IMM GRANULOCYTES NFR BLD AUTO: 0.6 % (ref 0–0.5)
INTERVENTRICULAR SEPTUM: 0.7 CM (ref 0.6–1.1)
IVC DIAMETER: 1.13 CM
KETONES UR QL STRIP: NEGATIVE
LEFT ATRIUM SIZE: 2.4 CM
LEFT INTERNAL DIMENSION IN SYSTOLE: 2.3 CM (ref 2.1–4)
LEFT VENTRICLE DIASTOLIC VOLUME INDEX: 47.73 ML/M2
LEFT VENTRICLE DIASTOLIC VOLUME: 73.03 ML
LEFT VENTRICLE MASS INDEX: 53.4 G/M2
LEFT VENTRICLE SYSTOLIC VOLUME INDEX: 12.1 ML/M2
LEFT VENTRICLE SYSTOLIC VOLUME: 18.46 ML
LEFT VENTRICULAR INTERNAL DIMENSION IN DIASTOLE: 4.1 CM (ref 3.5–6)
LEFT VENTRICULAR MASS: 81.7 G
LEUKOCYTE ESTERASE UR QL STRIP: NEGATIVE
LV LATERAL E/E' RATIO: 6.9 M/S
LV SEPTAL E/E' RATIO: 6.9 M/S
LVED V (TEICH): 73.03 ML
LVES V (TEICH): 18.46 ML
LVOT MG: 0.83 MMHG
LVOT MV: 0.42 CM/S
LYMPHOCYTES # BLD AUTO: 0.4 K/UL (ref 1–4.8)
LYMPHOCYTES NFR BLD: 6.8 % (ref 18–48)
MCH RBC QN AUTO: 32.1 PG (ref 27–31)
MCHC RBC AUTO-ENTMCNC: 35 G/DL (ref 32–36)
MCV RBC AUTO: 92 FL (ref 82–98)
MONOCYTES # BLD AUTO: 0.5 K/UL (ref 0.3–1)
MONOCYTES NFR BLD: 9.9 % (ref 4–15)
MV PEAK A VEL: 0.61 M/S
MV PEAK E VEL: 0.69 M/S
MV STENOSIS PRESSURE HALF TIME: 45.49 MS
MV VALVE AREA P 1/2 METHOD: 4.84 CM2
NEUTROPHILS # BLD AUTO: 4.3 K/UL (ref 1.8–7.7)
NEUTROPHILS NFR BLD: 82.3 % (ref 38–73)
NITRITE UR QL STRIP: NEGATIVE
NRBC BLD-RTO: 0 /100 WBC
OHS QRS DURATION: 88 MS
OHS QTC CALCULATION: 441 MS
OSMOLALITY SERPL: 273 MOSM/KG (ref 280–300)
OSMOLALITY UR: 319 MOSM/KG (ref 50–1200)
PH UR STRIP: 7 [PH] (ref 5–8)
PHOSPHATE SERPL-MCNC: 2.9 MG/DL (ref 2.7–4.5)
PISA TR MAX VEL: 2.2 M/S
PLATELET # BLD AUTO: 247 K/UL (ref 150–450)
PMV BLD AUTO: 8.8 FL (ref 9.2–12.9)
POCT GLUCOSE: 101 MG/DL (ref 70–110)
POCT GLUCOSE: 108 MG/DL (ref 70–110)
POTASSIUM SERPL-SCNC: 4.4 MMOL/L (ref 3.5–5.1)
POTASSIUM SERPL-SCNC: 4.5 MMOL/L (ref 3.5–5.1)
POTASSIUM SERPL-SCNC: 4.6 MMOL/L (ref 3.5–5.1)
PROT SERPL-MCNC: 7.6 G/DL (ref 6–8.4)
PROT UR QL STRIP: NEGATIVE
PV MV: 0.62 M/S
PV PEAK GRADIENT: 3 MMHG
PV PEAK VELOCITY: 0.82 M/S
RA PRESSURE ESTIMATED: 3 MMHG
RBC # BLD AUTO: 3.4 M/UL (ref 4.6–6.2)
RV TB RVSP: 5 MMHG
SINUS: 2.2 CM
SODIUM SERPL-SCNC: 127 MMOL/L (ref 136–145)
SODIUM SERPL-SCNC: 129 MMOL/L (ref 136–145)
SODIUM SERPL-SCNC: 129 MMOL/L (ref 136–145)
SODIUM UR-SCNC: 53 MMOL/L (ref 20–250)
SP GR UR STRIP: 1.01 (ref 1–1.03)
STJ: 2.56 CM
TDI LATERAL: 0.1 M/S
TDI SEPTAL: 0.1 M/S
TDI: 0.1 M/S
TR MAX PG: 19 MMHG
TSH SERPL DL<=0.005 MIU/L-ACNC: 0.83 UIU/ML (ref 0.4–4)
TV REST PULMONARY ARTERY PRESSURE: 22 MMHG
URN SPEC COLLECT METH UR: ABNORMAL
UROBILINOGEN UR STRIP-ACNC: NEGATIVE EU/DL
WBC # BLD AUTO: 5.27 K/UL (ref 3.9–12.7)
Z-SCORE OF LEFT VENTRICULAR DIMENSION IN END DIASTOLE: -0.85
Z-SCORE OF LEFT VENTRICULAR DIMENSION IN END SYSTOLE: -1.47

## 2025-01-16 PROCEDURE — 0HQ0XZZ REPAIR SCALP SKIN, EXTERNAL APPROACH: ICD-10-PCS | Performed by: EMERGENCY MEDICINE

## 2025-01-16 PROCEDURE — 80053 COMPREHEN METABOLIC PANEL: CPT | Performed by: NURSE PRACTITIONER

## 2025-01-16 PROCEDURE — 81003 URINALYSIS AUTO W/O SCOPE: CPT | Performed by: NURSE PRACTITIONER

## 2025-01-16 PROCEDURE — 93005 ELECTROCARDIOGRAM TRACING: CPT

## 2025-01-16 PROCEDURE — 25000003 PHARM REV CODE 250: Performed by: INTERNAL MEDICINE

## 2025-01-16 PROCEDURE — 83935 ASSAY OF URINE OSMOLALITY: CPT | Performed by: NURSE PRACTITIONER

## 2025-01-16 PROCEDURE — 21400001 HC TELEMETRY ROOM

## 2025-01-16 PROCEDURE — 87389 HIV-1 AG W/HIV-1&-2 AB AG IA: CPT | Performed by: EMERGENCY MEDICINE

## 2025-01-16 PROCEDURE — 12001 RPR S/N/AX/GEN/TRNK 2.5CM/<: CPT

## 2025-01-16 PROCEDURE — 63600175 PHARM REV CODE 636 W HCPCS: Performed by: NURSE PRACTITIONER

## 2025-01-16 PROCEDURE — 84300 ASSAY OF URINE SODIUM: CPT | Performed by: NURSE PRACTITIONER

## 2025-01-16 PROCEDURE — 96360 HYDRATION IV INFUSION INIT: CPT

## 2025-01-16 PROCEDURE — G0425 INPT/ED TELECONSULT30: HCPCS | Mod: 95,,, | Performed by: PSYCHIATRY & NEUROLOGY

## 2025-01-16 PROCEDURE — 63600175 PHARM REV CODE 636 W HCPCS: Performed by: HOSPITALIST

## 2025-01-16 PROCEDURE — 80069 RENAL FUNCTION PANEL: CPT | Performed by: INTERNAL MEDICINE

## 2025-01-16 PROCEDURE — 83930 ASSAY OF BLOOD OSMOLALITY: CPT | Performed by: HOSPITALIST

## 2025-01-16 PROCEDURE — 25000003 PHARM REV CODE 250: Performed by: HOSPITALIST

## 2025-01-16 PROCEDURE — 86803 HEPATITIS C AB TEST: CPT | Performed by: EMERGENCY MEDICINE

## 2025-01-16 PROCEDURE — 25000003 PHARM REV CODE 250: Performed by: NURSE PRACTITIONER

## 2025-01-16 PROCEDURE — 93010 ELECTROCARDIOGRAM REPORT: CPT | Mod: ,,, | Performed by: INTERNAL MEDICINE

## 2025-01-16 PROCEDURE — 80048 BASIC METABOLIC PNL TOTAL CA: CPT | Mod: XB | Performed by: HOSPITALIST

## 2025-01-16 PROCEDURE — 99285 EMERGENCY DEPT VISIT HI MDM: CPT | Mod: 25

## 2025-01-16 PROCEDURE — 85025 COMPLETE CBC W/AUTO DIFF WBC: CPT | Performed by: NURSE PRACTITIONER

## 2025-01-16 PROCEDURE — 84443 ASSAY THYROID STIM HORMONE: CPT | Performed by: NURSE PRACTITIONER

## 2025-01-16 RX ORDER — SODIUM CHLORIDE 1 G/1
2000 TABLET ORAL 2 TIMES DAILY
Status: DISCONTINUED | OUTPATIENT
Start: 2025-01-16 | End: 2025-01-18 | Stop reason: HOSPADM

## 2025-01-16 RX ORDER — NALOXONE HCL 0.4 MG/ML
0.02 VIAL (ML) INJECTION
Status: DISCONTINUED | OUTPATIENT
Start: 2025-01-16 | End: 2025-01-18 | Stop reason: HOSPADM

## 2025-01-16 RX ORDER — NYSTATIN 100000 [USP'U]/ML
500000 SUSPENSION ORAL 4 TIMES DAILY
Status: DISCONTINUED | OUTPATIENT
Start: 2025-01-16 | End: 2025-01-18 | Stop reason: HOSPADM

## 2025-01-16 RX ORDER — BENZTROPINE MESYLATE 1 MG/1
1 TABLET ORAL DAILY
Status: DISCONTINUED | OUTPATIENT
Start: 2025-01-17 | End: 2025-01-18 | Stop reason: HOSPADM

## 2025-01-16 RX ORDER — CARBAMAZEPINE 200 MG/1
200 TABLET, EXTENDED RELEASE ORAL ONCE
Status: COMPLETED | OUTPATIENT
Start: 2025-01-17 | End: 2025-01-17

## 2025-01-16 RX ORDER — ENOXAPARIN SODIUM 100 MG/ML
40 INJECTION SUBCUTANEOUS EVERY 24 HOURS
Status: DISCONTINUED | OUTPATIENT
Start: 2025-01-16 | End: 2025-01-17 | Stop reason: DRUGHIGH

## 2025-01-16 RX ORDER — LEVETIRACETAM 500 MG/1
500 TABLET ORAL 2 TIMES DAILY
Status: DISCONTINUED | OUTPATIENT
Start: 2025-01-17 | End: 2025-01-18 | Stop reason: HOSPADM

## 2025-01-16 RX ORDER — LEVETIRACETAM 500 MG/1
500 TABLET ORAL ONCE
Status: COMPLETED | OUTPATIENT
Start: 2025-01-16 | End: 2025-01-16

## 2025-01-16 RX ORDER — IBUPROFEN 200 MG
16 TABLET ORAL
Status: DISCONTINUED | OUTPATIENT
Start: 2025-01-16 | End: 2025-01-18 | Stop reason: HOSPADM

## 2025-01-16 RX ORDER — TALC
6 POWDER (GRAM) TOPICAL NIGHTLY PRN
Status: DISCONTINUED | OUTPATIENT
Start: 2025-01-16 | End: 2025-01-18 | Stop reason: HOSPADM

## 2025-01-16 RX ORDER — CARBAMAZEPINE 200 MG/1
400 TABLET, EXTENDED RELEASE ORAL ONCE
Status: COMPLETED | OUTPATIENT
Start: 2025-01-16 | End: 2025-01-16

## 2025-01-16 RX ORDER — LIDOCAINE HYDROCHLORIDE 10 MG/ML
5 INJECTION, SOLUTION INFILTRATION; PERINEURAL
Status: COMPLETED | OUTPATIENT
Start: 2025-01-16 | End: 2025-01-16

## 2025-01-16 RX ORDER — IBUPROFEN 200 MG
24 TABLET ORAL
Status: DISCONTINUED | OUTPATIENT
Start: 2025-01-16 | End: 2025-01-18 | Stop reason: HOSPADM

## 2025-01-16 RX ORDER — RISPERIDONE 1 MG/1
3 TABLET ORAL DAILY
Status: DISCONTINUED | OUTPATIENT
Start: 2025-01-16 | End: 2025-01-18 | Stop reason: HOSPADM

## 2025-01-16 RX ORDER — SODIUM CHLORIDE 0.9 % (FLUSH) 0.9 %
10 SYRINGE (ML) INJECTION
Status: DISCONTINUED | OUTPATIENT
Start: 2025-01-16 | End: 2025-01-18 | Stop reason: HOSPADM

## 2025-01-16 RX ORDER — LOPERAMIDE HYDROCHLORIDE 2 MG/1
2 CAPSULE ORAL 4 TIMES DAILY PRN
Status: DISCONTINUED | OUTPATIENT
Start: 2025-01-16 | End: 2025-01-18 | Stop reason: HOSPADM

## 2025-01-16 RX ORDER — GLUCAGON 1 MG
1 KIT INJECTION
Status: DISCONTINUED | OUTPATIENT
Start: 2025-01-16 | End: 2025-01-18 | Stop reason: HOSPADM

## 2025-01-16 RX ORDER — ONDANSETRON HYDROCHLORIDE 2 MG/ML
4 INJECTION, SOLUTION INTRAVENOUS EVERY 4 HOURS PRN
Status: DISCONTINUED | OUTPATIENT
Start: 2025-01-16 | End: 2025-01-18 | Stop reason: HOSPADM

## 2025-01-16 RX ORDER — ACETAMINOPHEN 325 MG/1
650 TABLET ORAL EVERY 6 HOURS PRN
Status: DISCONTINUED | OUTPATIENT
Start: 2025-01-16 | End: 2025-01-18 | Stop reason: HOSPADM

## 2025-01-16 RX ORDER — SODIUM CHLORIDE 9 MG/ML
INJECTION, SOLUTION INTRAVENOUS CONTINUOUS
Status: ACTIVE | OUTPATIENT
Start: 2025-01-16 | End: 2025-01-17

## 2025-01-16 RX ADMIN — LIDOCAINE HYDROCHLORIDE 5 ML: 10 INJECTION, SOLUTION INFILTRATION; PERINEURAL at 11:01

## 2025-01-16 RX ADMIN — CARBAMAZEPINE 400 MG: 200 TABLET, EXTENDED RELEASE ORAL at 06:01

## 2025-01-16 RX ADMIN — SODIUM CHLORIDE 2000 MG: 1 TABLET ORAL at 03:01

## 2025-01-16 RX ADMIN — ENOXAPARIN SODIUM 40 MG: 40 INJECTION SUBCUTANEOUS at 05:01

## 2025-01-16 RX ADMIN — NYSTATIN 500000 UNITS: 100000 SUSPENSION ORAL at 05:01

## 2025-01-16 RX ADMIN — SODIUM CHLORIDE 1000 ML: 9 INJECTION, SOLUTION INTRAVENOUS at 10:01

## 2025-01-16 RX ADMIN — Medication: at 10:01

## 2025-01-16 RX ADMIN — RISPERIDONE 3 MG: 1 TABLET, FILM COATED ORAL at 03:01

## 2025-01-16 RX ADMIN — NYSTATIN 500000 UNITS: 100000 SUSPENSION ORAL at 09:01

## 2025-01-16 RX ADMIN — NYSTATIN 500000 UNITS: 100000 SUSPENSION ORAL at 03:01

## 2025-01-16 RX ADMIN — SODIUM CHLORIDE: 9 INJECTION, SOLUTION INTRAVENOUS at 03:01

## 2025-01-16 RX ADMIN — RISPERIDONE 3 MG: 1 TABLET, FILM COATED ORAL at 09:01

## 2025-01-16 RX ADMIN — LEVETIRACETAM 500 MG: 500 TABLET, FILM COATED ORAL at 03:01

## 2025-01-16 RX ADMIN — SODIUM CHLORIDE 2000 MG: 1 TABLET ORAL at 09:01

## 2025-01-16 NOTE — CONSULTS
Consult Note  Nephrology    Consult Requested By: Patience Kaur MD  Reason for Consult: Recurrent hyponatrmeia    SUBJECTIVE:     History of Present Illness:  Patient is a 64 y.o. male presents with PMHx significant for bipolar disorder, seizure disorder, 50+ pack yr smoking history, newly diagnosed Squamous Cell Carcinoma of the LLL known to our group from a recent admission for hyponatremia due to a mixed picture with recurrent volume depletionfrom poor intake on chronic drug / Lung Ca induced SIADH (carbazmazepine) who presents to ED with LOC and hit his head upon getting out of bed this am. Pt noted to be significantly orthostatic with standing BP droping from 131/81 to 85/62. He has had multiple admission for the same and at last discharge was supposed to be on 500ml free water restriction and NaCl tabs 2g bid, but he was not taking these pills. Due to his intellectual disability his sister is responsible for his care. Serum Na 127 on presentation which is not far off of his baseline of 130 in 2024.  Previously the decision to continue carbamazepine was made due to the fact that his his seizures had been difficult control without carbamazepinemay have caused his hyponatremia and serum Na corrected easily with volume resuscitation.    Past Medical History:   Diagnosis Date    Bipolar disorder     Schizoaffective disorder      History reviewed. No pertinent surgical history.  Family History   Problem Relation Name Age of Onset    Heart disease Father      Heart attack Father      Diabetes Sister      Seizures Sister      Hypertension Brother      Diabetes Brother      Asthma Mother       Social History     Tobacco Use    Smoking status: Former     Current packs/day: 0.00     Types: Cigarettes     Quit date: 6/15/2024     Years since quittin.5   Substance Use Topics    Alcohol use: No    Drug use: No       (Not in a hospital admission)      Review of patient's allergies indicates:  No Known Allergies  "    Review of Systems:  Constitutional: No fever or chills, no weight changes.  Eyes: No visual changes or photophobia  HEENT: No nasal congestion or sore throat  Respiratory: No cough or shorness of breath  Cardiovascular: No chest pain or palpitations  Gastrointestinal: Good appetite, no nausea or vomiting, no change in bowel habits  Genitourinary: No hematuria or dysuria  Skin: No rash or pruritis  Hematologic/lymphatic: No easy bruising, bleeding or lymphadenopathy  Musculoskeletal: No arthralgias or myalgias  Neurological: No seizures or tremors  Endocrine: No heat/cold intolerance.  No polyuria/polydipsia.  Psychiatric:  No depression or anxiety.     OBJECTIVE:     Vital Signs (Most Recent)  Temp: 97.7 °F (36.5 °C) (01/16/25 0912)  Pulse: 66 (01/16/25 1137)  Resp: 20 (01/16/25 0910)  BP: (!) 149/89 (01/16/25 1137)  SpO2: 98 % (01/16/25 1137)    Vital Signs Range (Last 24H):  Temp:  [97.7 °F (36.5 °C)]   Pulse:  [66-90]   Resp:  [20]   BP: ()/(62-89)   SpO2:  [98 %-100 %]     Physical Exam:    GEN:  Alert, thin but not cachectic  HEENT:  MARKO, No conjunctivitis, normal sclera, O/P clear, no oropharyngial lesions, no thrush, neck supple, No LAD, Nml JVD, no carotid bruits  CV:  RRR no MRG  Lungs:  CTAb, no rhonchi, wheeze, crackles, normal excursion  Abdomen: Obese, soft, NTND, no fluid wave, no HSM, NABS  Ext:  No CCE, normal DP pulses bilat  Neuro:  Non-Focal, EOMI, gait not assessed  Skin:  No rash, excoriation, petchiae    Diagnostic Results:  Labs: Reviewed    ASSESSMENT/PLAN:     Hypotonic Hyponatremia due to a mixed picture with recurrent volume depletion from poor intake on chronic drug (carbazmazepine) / Lung Ca induced SIADH   -Admit Na 127  -recent baseline 130  -He has been on NaCl tabs 2g bid as RX'd at last discharge due "ran out". Sister just "adding some salt to his food."  -Also appears again with significant volume depletion and orthostatic hypotension.  -Given NS 1L in ED  -Urine Osm/ " lytes ordered and these will reflect post NS bolus values.  -send repeat stat Na now> 129  -Okay to continue carbamazepine or change to keppra  -Free water restriction 500ml/ daily  -would not engage in repeat GALARZA.  -resume 2g NaCl bid and ensure fluid intake 2L with 500ml free water restrictionover next day with nursing.  -suspect he may be able to go home tomorrow.

## 2025-01-16 NOTE — ED PROVIDER NOTES
Encounter Date: 2025       History     Chief Complaint   Patient presents with    Fall     EMS activated for trip and fall, + head trauma, lac to top of head, not on blood thinners. Family reports more frequent falls.     64-year-old male with bipolar, schizoaffective disorder, lung cancer, and pharyngeal cancer which presents to the emergency room after he stood up upon waking and then got dizzy and fell.  Currently getting radiation, next treatment is . He hit his head on the dresser causing a small laceration.  He denies any other pain.  His sister is with him at the bedside and states that the patient has had a lot of falls this past week and has a history of low sodium levels.  She has attempted to increase his sodium intake and he drinks several Gatorades during the day. No LOC after the fall. Denies taking anticoagulants.     The history is provided by the patient.     Review of patient's allergies indicates:  No Known Allergies  Past Medical History:   Diagnosis Date    Bipolar disorder     Schizoaffective disorder      History reviewed. No pertinent surgical history.  Family History   Problem Relation Name Age of Onset    Heart disease Father      Heart attack Father      Diabetes Sister      Seizures Sister      Hypertension Brother      Diabetes Brother      Asthma Mother       Social History     Tobacco Use    Smoking status: Former     Current packs/day: 0.00     Types: Cigarettes     Quit date: 6/15/2024     Years since quittin.5   Substance Use Topics    Alcohol use: No    Drug use: No     Review of Systems   Constitutional:  Negative for fever.   HENT:  Negative for sore throat.    Respiratory:  Negative for shortness of breath.    Cardiovascular:  Negative for chest pain.   Gastrointestinal:  Negative for nausea.   Genitourinary:  Negative for dysuria.   Musculoskeletal:  Negative for back pain.   Skin:  Positive for wound (posterior scalp). Negative for rash.   Neurological:  Positive  for dizziness (resolved). Negative for weakness.   Hematological:  Does not bruise/bleed easily.   All other systems reviewed and are negative.      Physical Exam     Initial Vitals   BP Pulse Resp Temp SpO2   01/16/25 0910 01/16/25 0910 01/16/25 0910 01/16/25 0912 01/16/25 0910   125/86 74 20 97.7 °F (36.5 °C) 98 %      MAP       --                Physical Exam    Nursing note and vitals reviewed.  Constitutional: He appears well-developed. He appears cachectic.   HENT:   Head: Normocephalic and atraumatic.   Dry oral mucous membranes   Eyes: Conjunctivae and EOM are normal. Pupils are equal, round, and reactive to light.   Neck:   Normal range of motion.  Cardiovascular:  Normal rate, regular rhythm and intact distal pulses.     Exam reveals no gallop and no friction rub.       Murmur heard.  Pulmonary/Chest: Breath sounds normal. No respiratory distress. He has no wheezes. He has no rhonchi. He has no rales. He exhibits no tenderness.   Abdominal: Abdomen is soft. Bowel sounds are normal. He exhibits no distension and no mass. There is no abdominal tenderness. There is no rebound and no guarding.   Musculoskeletal:         General: No tenderness or edema. Normal range of motion.      Cervical back: Normal range of motion.     Neurological: He is alert and oriented to person, place, and time. He has normal strength. GCS score is 15. GCS eye subscore is 4. GCS verbal subscore is 5. GCS motor subscore is 6.   Skin: Skin is warm. Capillary refill takes less than 2 seconds.   Small 2 cm laceration noted to the posterior scalp- bleeding controlled- no crepitus noted to skull       ED Course   Lac Repair    Date/Time: 1/16/2025 11:28 AM    Performed by: Evelin Hamilton FNP  Authorized by: Patience Kaur MD    Consent:     Consent obtained:  Verbal    Consent given by:  Patient (sister)    Risks discussed:  Pain, poor cosmetic result, poor wound healing and infection    Alternatives discussed:  No  treatment  Universal protocol:     Patient identity confirmed:  Verbally with patient  Anesthesia:     Anesthesia method:  Topical application and local infiltration    Topical anesthetic:  LET    Local anesthetic:  Lidocaine 1% w/o epi  Laceration details:     Location:  Scalp    Scalp location:  Occipital    Length (cm):  2.5  Exploration:     Limited defect created (wound extended): no      Contaminated: no    Treatment:     Area cleansed with:  Soap and water    Amount of cleaning:  Standard    Debridement:  None    Undermining:  None    Scar revision: no    Skin repair:     Repair method:  Staples    Number of staples:  3  Approximation:     Approximation:  Close  Repair type:     Repair type:  Simple  Post-procedure details:     Dressing:  Open (no dressing)    Procedure completion:  Tolerated    Labs Reviewed   CBC W/ AUTO DIFFERENTIAL - Abnormal       Result Value    WBC 5.27      RBC 3.40 (*)     Hemoglobin 10.9 (*)     Hematocrit 31.1 (*)     MCV 92      MCH 32.1 (*)     MCHC 35.0      RDW 12.9      Platelets 247      MPV 8.8 (*)     Immature Granulocytes 0.6 (*)     Gran # (ANC) 4.3      Immature Grans (Abs) 0.03      Lymph # 0.4 (*)     Mono # 0.5      Eos # 0.0      Baso # 0.02      nRBC 0      Gran % 82.3 (*)     Lymph % 6.8 (*)     Mono % 9.9      Eosinophil % 0.0      Basophil % 0.4      Differential Method Automated     COMPREHENSIVE METABOLIC PANEL - Abnormal    Sodium 127 (*)     Potassium 4.6      Chloride 93 (*)     CO2 25      Glucose 97      BUN 8      Creatinine 0.9      Calcium 9.0      Total Protein 7.6      Albumin 2.7 (*)     Total Bilirubin 0.2      Alkaline Phosphatase 94      AST 23      ALT 16      eGFR >60      Anion Gap 9     HEPATITIS C ANTIBODY    Hepatitis C Ab Negative      Narrative:     Release to patient->Immediate   HIV 1 / 2 ANTIBODY    HIV 1/2 Ag/Ab Negative      Narrative:     Release to patient->Immediate   TSH   SODIUM, URINE, RANDOM   OSMOLALITY, URINE RANDOM    URINALYSIS, REFLEX TO URINE CULTURE   OSMOLALITY, SERUM   TSH   RENAL FUNCTION PANEL   OSMOLALITY, URINE RANDOM   SODIUM, URINE, RANDOM     EKG Readings: (Independently Interpreted)   Initial Reading: No STEMI. Previous EKG: Compared with most recent EKG Previous EKG Date: 8/10/24. Rhythm: Normal Sinus Rhythm. Heart Rate: 69. Clinical Impression: Normal Sinus Rhythm       Imaging Results              X-Ray Chest PA And Lateral (Final result)  Result time 01/16/25 10:30:44      Final result by Rufus Melton MD (01/16/25 10:30:44)                   Impression:      As above recommend clinical correlation.      Electronically signed by: Rufus Melton MD  Date:    01/16/2025  Time:    10:30               Narrative:    EXAMINATION:  XR CHEST PA AND LATERAL    CLINICAL HISTORY:  Dizziness and giddiness    TECHNIQUE:  PA and lateral views of the chest were performed.    COMPARISON:  Chest radiograph dated December 12, 2024    FINDINGS:  The trachea and cardiomediastinal silhouette remains stable.  Architectural distortion and opacity is identified in the left upper lobe, this could be due to post radiation changes.  Right lung is grossly clear.  No evidence for large pneumothorax or pleural effusion.  Osseous structures demonstrate no evidence for acute fractures or dislocations.                                       CT Head Without Contrast (Final result)  Result time 01/16/25 09:55:59      Final result by Jordin Ku DO (01/16/25 09:55:59)                   Impression:      CT head: No acute intracranial findings as detailed above specifically without evidence for acute intracranial hemorrhage or sulcal effacement to suggest large territory recent infarction.    Relatively stable sellar suprasellar lesion allowing for differences in technique as detailed above.    CT cervical spine: Spondylo degenerative change cervical spine without evidence for acute fracture or traumatic subluxation    Clinical correlation and  further evaluation with MRI as warranted.      Electronically signed by: Jordin Ku DO  Date:    01/16/2025  Time:    09:55               Narrative:    EXAMINATION:  CT HEAD WITHOUT CONTRAST; CT CERVICAL SPINE WITHOUT CONTRAST    CLINICAL HISTORY:  Head trauma, moderate-severe;; Neck trauma, impaired ROM (Age 16-64y);    TECHNIQUE:  CT head: Multiple sequential 5 mm axial images of the head without contrast.  Coronal and sagittal reformatted imaging from the axial acquisition.    CT cervical spine: Multiple sequential 1.25 mm axial images of the cervical spine without contrast.  Coronal and sagittal reformatted imaging from the axial acquisition.    COMPARISON:  MRI brain 09/19/2016    FINDINGS:  CT head: Study slightly limited by motion artifacts.  Within limits of the study there is no evidence for acute intracranial hemorrhage or sulcal effacement.  Ventricles normal in size without hydrocephalus.  No midline shift or mass effect.  Soft tissue fullness within the sella and suprasellar cistern likely corresponds to lesion seen on MRI.  Overall relatively stable in size allowing for differences in technique measuring 1.4 cm craniocaudal..    CT cervical spine: Reversal of the normal cervical lordosis with grade 1 anterolisthesis of C2 on C3.  There is intervertebral height loss and endplate degeneration all levels allowing for degenerative change no evidence for acute fracture or traumatic subluxation cervical spine.  No consolidation visualized lung apices    Allowing for artifact from motion and beam hardening degenerative change most pronounced at C4/C5 with posterior disc osteophyte, uncovertebral joint hypertrophy and facet arthropathy mild central canal stenosis and moderate right greater than left neural foraminal stenosis.    Emphysematous change in the visualized lung apices.                                       CT Cervical Spine Without Contrast (Final result)  Result time 01/16/25 09:55:59      Final  result by Jordin Ku DO (01/16/25 09:55:59)                   Impression:      CT head: No acute intracranial findings as detailed above specifically without evidence for acute intracranial hemorrhage or sulcal effacement to suggest large territory recent infarction.    Relatively stable sellar suprasellar lesion allowing for differences in technique as detailed above.    CT cervical spine: Spondylo degenerative change cervical spine without evidence for acute fracture or traumatic subluxation    Clinical correlation and further evaluation with MRI as warranted.      Electronically signed by: Jordin Ku DO  Date:    01/16/2025  Time:    09:55               Narrative:    EXAMINATION:  CT HEAD WITHOUT CONTRAST; CT CERVICAL SPINE WITHOUT CONTRAST    CLINICAL HISTORY:  Head trauma, moderate-severe;; Neck trauma, impaired ROM (Age 16-64y);    TECHNIQUE:  CT head: Multiple sequential 5 mm axial images of the head without contrast.  Coronal and sagittal reformatted imaging from the axial acquisition.    CT cervical spine: Multiple sequential 1.25 mm axial images of the cervical spine without contrast.  Coronal and sagittal reformatted imaging from the axial acquisition.    COMPARISON:  MRI brain 09/19/2016    FINDINGS:  CT head: Study slightly limited by motion artifacts.  Within limits of the study there is no evidence for acute intracranial hemorrhage or sulcal effacement.  Ventricles normal in size without hydrocephalus.  No midline shift or mass effect.  Soft tissue fullness within the sella and suprasellar cistern likely corresponds to lesion seen on MRI.  Overall relatively stable in size allowing for differences in technique measuring 1.4 cm craniocaudal..    CT cervical spine: Reversal of the normal cervical lordosis with grade 1 anterolisthesis of C2 on C3.  There is intervertebral height loss and endplate degeneration all levels allowing for degenerative change no evidence for acute fracture or  traumatic subluxation cervical spine.  No consolidation visualized lung apices    Allowing for artifact from motion and beam hardening degenerative change most pronounced at C4/C5 with posterior disc osteophyte, uncovertebral joint hypertrophy and facet arthropathy mild central canal stenosis and moderate right greater than left neural foraminal stenosis.    Emphysematous change in the visualized lung apices.                                       Medications   LETS (LIDOcaine-TETRAcaine-EPINEPHrine) gel solution ( Topical (Top) Given 1/16/25 1030)   sodium chloride 0.9% bolus 1,000 mL 1,000 mL (1,000 mLs Intravenous New Bag 1/16/25 1034)   LIDOcaine HCL 10 mg/ml (1%) injection 5 mL (5 mLs Infiltration Given by Provider 1/16/25 1134)     Medical Decision Making  63 y/o male which presents after a syncopal episode upon standing up after waking up this morning. Pt hit his head on the dresser and sustained a small scalp laceration which was repaired with staples. Pt was noted to be extremely orthostatic and was given a liter of fluids in the ED. Labs indicated hyponatremia. Due to extreme dehydration, the patient will be admitted to hospital medicine for further management. Discussed the case with Dr. Nix who will admit for treatment of dehydration and hyponatremia. Sister is at the bedside and has been updated and agrees with plan.    Differential Diagnosis: syncope, hyponatremia, dehydration, malnutrition, progression of cancer, radiation side effect, UTI, SIADH    Problems Addressed:  Dizziness: acute illness or injury  Hyponatremia: acute illness or injury  Orthostatic syncope: acute illness or injury    Amount and/or Complexity of Data Reviewed  Labs: ordered. Decision-making details documented in ED Course.  Radiology: ordered.    Risk  Prescription drug management.  Decision regarding hospitalization.               ED Course as of 01/16/25 1205   Thu Jan 16, 2025   0914 BP: 125/86 [AT]   0914 Temp: 97.7 °F  (36.5 °C) [AT]   0914 Temp Source: Oral [AT]   0914 Pulse: 74 [AT]   0914 Resp: 20 [AT]   0914 SpO2: 98 % [AT]   1058 Sodium(!): 127 [AT]   1101 Hemoglobin(!): 10.9  baseline [AT]      ED Course User Index  [AT] Evelin Hamilton FNP                           Clinical Impression:  Final diagnoses:  [R42] Dizziness  [E87.1] Hyponatremia  [I95.1] Orthostatic syncope (Primary)          ED Disposition Condition    Admit Stable                Evelin Hamilton FNP  01/16/25 1205       Evelin Hamilton FNP  01/16/25 1215

## 2025-01-16 NOTE — ED TRIAGE NOTES
Pt reports to ED after he got out of bed and passed out. Hit his head on a dresser and has a small lac to back of head. Does not remember falling and unsure if LOC. Denies blood thinner use. Pt is currently aaox4 and not reporting pain. VSS.

## 2025-01-16 NOTE — TELEMEDICINE CONSULT
[facility]   General Neurology  Consult Note      Consult Information  Consults    Consulting Provider:    Current Providers  No providers found    Patient Location:  [facility] EMERGENCY DEPARTMENT Emergency Department    Spoke hospital nurse at bedside with patient assisting consultant.  Patient information was obtained from patient, relative(s), past medical records, and primary team.       Neurology Documentation   HPI :65 y/o with bipolar and schizoaffective disorders, lung cancer, pharyngeal cancer, suprasellar mass, and long standing history of generalized tonic clonic seizures on carbamazepine, admitted to the hospital due to syncope and noted to have symptomatic hyponatremia.  He hasn't had a seizure in more than 10 years on carbamazepine but with undesirable chronic hyponatremia as side effect, thus a neurology consultation for alternative AED  recommendation was requested.  Today, he offers no new neurological complains.    Blood pressure (!) 146/78, pulse 65, temperature 97.7 °F (36.5 °C), temperature source Oral, resp. rate 20, SpO2 100%.    Assessment and Plan  Chronic, currently symptomatic carbamazepine induced hyponatremia.  Please, be aware that a wide variety of AED can induced hyponatremia but in this case will elect switching to keppra as it has less potential for inducing severe thrombocytopenia.  Recommend starting Keppra 500 mg today 1/16 and then 500 mg twice daily afterwards starting tomorrow.  Carbamazepine 200 mg two tablets PM today (1/16) .  Carbamazepine 200 mg take 1 tablet am tomorrow 1/17.  Stop carbamazepine on 1/8 and then continue monotherapy with keppra 500 mg BID.  Check daily Na.  Seizure precautions  Follow up neurology in 2-3 weeks.    Additional Physical Exam, History, & ROS  Review of Systems   Constitutional:  Positive for weight loss. Negative for diaphoresis and fever.   HENT:  Negative for hearing loss.    Eyes:  Negative for double vision.   Respiratory:  Negative for  hemoptysis and shortness of breath.    Cardiovascular:  Negative for chest pain and palpitations.   Gastrointestinal:  Negative for blood in stool and vomiting.   Genitourinary:  Negative for hematuria.   Musculoskeletal:  Positive for falls. Negative for neck pain.   Skin:  Negative for rash.   Neurological:  Positive for seizures. Negative for dizziness, tremors, weakness and headaches.   Endo/Heme/Allergies:  Does not bruise/bleed easily.   Psychiatric/Behavioral:  Negative for hallucinations and memory loss.      Physical Exam  Vitals and nursing note reviewed.   Constitutional:       Appearance: He is ill-appearing. He is not diaphoretic.   HENT:      Head: Normocephalic and atraumatic.   Eyes:      Extraocular Movements: Extraocular movements intact.   Cardiovascular:      Rate and Rhythm: Normal rate and regular rhythm.   Pulmonary:      Effort: No respiratory distress.   Abdominal:      General: There is no distension.   Genitourinary:     Comments: na  Musculoskeletal:      Cervical back: Normal range of motion.      Right lower leg: No edema.      Left lower leg: No edema.   Skin:     Findings: No erythema or rash.   Neurological:      Mental Status: He is alert and oriented to person, place, and time.      Cranial Nerves: No cranial nerve deficit.      Sensory: No sensory deficit.      Motor: No weakness.      Coordination: Coordination normal.   Psychiatric:         Mood and Affect: Mood normal.         Behavior: Behavior normal.       Past Medical History:   Diagnosis Date    Bipolar disorder     Schizoaffective disorder      History reviewed. No pertinent surgical history.  Family History   Problem Relation Name Age of Onset    Heart disease Father      Heart attack Father      Diabetes Sister      Seizures Sister      Hypertension Brother      Diabetes Brother      Asthma Mother         Diagnoses  GTC epilepsy.  Carbamazepine induced hyponatremia    Raad Velasco MD    Neurology consultation  requested by spoke provider. Audiovisual encounter with the patient performed using a secure connection.  Results and impressions from the visit are documented on this note and were communicated to the consulting provider/team via direct communication. The note has been shared for addition to the patients electronic medical record.

## 2025-01-16 NOTE — ASSESSMENT & PLAN NOTE
-No seizure in more than a decade and has been on carbemazepine for many years now.  -Given recurrent hyponatremia which may be related to carbemazepine will consult neurology for evaluation of possibly changing to alternative medication.  -Seizure precautions ordered.

## 2025-01-16 NOTE — ASSESSMENT & PLAN NOTE
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 Srinivas Dempsey ([facility]) and Dr.Kendra Marmolejo ([facility]).  I reviewed the last clinic notes from both of these.  -His therapy is intended to be curative. He completed chemoradiotherapy with weekly carbo/taxol to 60Gy on 11/15/24 and transitioned successfully to adjuvant durvalumab which is planned to continue for 1 year.  His last dose was received 12/2024 per his sister.  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

## 2025-01-16 NOTE — ASSESSMENT & PLAN NOTE
-Admitted to inpatient status  -Presented after 4th fall in last week.  Today was associated with light headedness and traumatic syncope.  -Head CT and CT c-spine without evidence of hemorrhage or fractures  -Positive orthostatics.  Systolic bp 131 laying and only 85 standing  -Na is low and he does appear hypovolemic on exam with dry lips and skin.  -EKG shows NSR.  -Monitor on telemetry and check echocardiogram  -Will give an additional liter of NS today and ask for nephrology for their input regarding his hyponatremia.    -Fall and seizure precautions ordered.  Will consult PT/OT

## 2025-01-16 NOTE — ASSESSMENT & PLAN NOTE
-Patient with at least 2 prior hospitalizations with notably hyponatremia.  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  His malignancy may also play a role as could adrenal insufficiency.  -On admit he appears hypovolemic, is orthostatic and Na is 127.  Received 1L NS in ED.  -Noted prior AM cortisol 8/2024 was only 8.1 - do not see that ACTH stimulation test was performed.  -Check urine Na, urine osm and serum osm  -Check AM cortisol and TSH.    -Consult nephrology for assistance.  -Consult neurology for consideration of choosing alternative anti-epileptic agent.  -Will give 1 additional liter NS over next 10 hours.  Suspect will ultimately need to pursue fluid restriction.  -Repeat labs at 4PM this afternoon and again in AM.

## 2025-01-16 NOTE — ASSESSMENT & PLAN NOTE
-History noted and is well controlled  -Continue home cogentin and risperidone.  -Delirium precautions ordered   Rec'd patient ambulatory to P1 Mother states she was called by school after child was disrespectful to a  \"threatening to pull her hair until she had a bald spot and give her a black eye\".  Mother states child then at home had a tantrum sta

## 2025-01-16 NOTE — ASSESSMENT & PLAN NOTE
-Patient appears thin and frail, but sister reports he has always been thin and has good oral intake without recent weight loss  -Measurements:  Wt Readings from Last 1 Encounters:   12/11/24 49.9 kg (110 lb)   There is no height or weight on file to calculate BMI.  -Add boost shakes  -Treat for possible oral thrush with nystatin swish and swallow  -Consult nutrition

## 2025-01-16 NOTE — SUBJECTIVE & OBJECTIVE
Past Medical History:   Diagnosis Date    Bipolar disorder     Hyponatremia     Laryngeal cancer     Lung cancer     Schizoaffective disorder     Seizure disorder        History reviewed. No pertinent surgical history.    Review of patient's allergies indicates:  No Known Allergies    No current facility-administered medications on file prior to encounter.     Current Outpatient Medications on File Prior to Encounter   Medication Sig    albuterol (PROVENTIL/VENTOLIN HFA) 90 mcg/actuation inhaler Inhale 2 puffs into the lungs every 4 (four) hours as needed for Shortness of Breath.    benztropine (COGENTIN) 1 MG tablet Take 1 tablet by mouth 2 (two) times daily.    carBAMazepine (TEGRETOL) 200 mg tablet Take 1 tablet (200 mg total) by mouth every morning.    carBAMazepine (TEGRETOL) 200 mg tablet Take 2 tablets (400 mg total) by mouth every evening.    risperiDONE (RISPERDAL) 3 MG Tab Take 1 tablet by mouth 2 (two) times daily.     Family History       Problem Relation (Age of Onset)    Asthma Mother    Diabetes Sister, Brother    Heart attack Father    Heart disease Father    Hypertension Brother    Seizures Sister          Tobacco Use    Smoking status: Former     Current packs/day: 0.00     Types: Cigarettes     Quit date: 6/15/2024     Years since quittin.5    Smokeless tobacco: Not on file   Substance and Sexual Activity    Alcohol use: No    Drug use: No    Sexual activity: Not on file     Review of Systems   All other systems reviewed and are negative.    Objective:     Vital Signs (Most Recent):  Temp: 97.7 °F (36.5 °C) (25 0912)  Pulse: 65 (25 1202)  Resp: 20 (25 0910)  BP: (!) 146/78 (25 1202)  SpO2: 100 % (25 1202) Vital Signs (24h Range):  Temp:  [97.7 °F (36.5 °C)] 97.7 °F (36.5 °C)  Pulse:  [65-90] 65  Resp:  [20] 20  SpO2:  [98 %-100 %] 100 %  BP: ()/(62-89) 146/78        There is no height or weight on file to calculate BMI.     Physical Exam  Vitals and nursing  note reviewed.   Constitutional:       General: He is not in acute distress.     Appearance: He is well-developed. He is not ill-appearing or toxic-appearing.      Comments: Appears thin and mallnourished   HENT:      Head: Normocephalic.      Comments: 2cm posterior scalp laceration with 3 staples     Right Ear: External ear normal.      Left Ear: External ear normal.      Nose: Nose normal.      Mouth/Throat:      Mouth: Mucous membranes are dry.      Comments: Tongue has thick white material - suspect thrush  Eyes:      Extraocular Movements: Extraocular movements intact.   Cardiovascular:      Rate and Rhythm: Normal rate and regular rhythm.      Heart sounds: Normal heart sounds. No murmur heard.  Pulmonary:      Effort: Pulmonary effort is normal. No respiratory distress.      Breath sounds: Normal breath sounds.   Abdominal:      General: Bowel sounds are normal. There is no distension.      Palpations: Abdomen is soft.      Tenderness: There is no abdominal tenderness.   Musculoskeletal:         General: Normal range of motion.      Cervical back: Normal range of motion. No rigidity.      Right lower leg: No edema.      Left lower leg: No edema.   Skin:     General: Skin is warm and dry.   Neurological:      Mental Status: He is alert and oriented to person, place, and time. Mental status is at baseline.      Comments: Chronic dysarthria - at baseline per his sister.     Psychiatric:         Mood and Affect: Mood normal.         Behavior: Behavior normal.                Significant Labs: All pertinent labs within the past 24 hours have been reviewed.    Significant Imaging: I have reviewed all pertinent imaging results/findings within the past 24 hours.

## 2025-01-16 NOTE — HPI
"Mr. 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  He has chronic dysarthria and lives with his sister, Stacia [phone] who is his medical power of .  History is primary obtained from her.  He had his last chemotherapy last month and since then has been doing well.  About a week ago he started having falls and has 4 falls in the last week.  This morning, he had another fall and hit the back of his head on the dresser.  He reports feeling light headed shortly after he started walking today.  He suffered a laceration and his sister reports significant bleeding at home.  She reports that his balance has been "a bit off" for quite some time, but overall has appeared stable.  She reports that his last seizure was more than a decade ago.  She states he has been eating and drinking well.  He has been thin his entire life and they deny any decreased oral consumption of food or water and also deny any recent weight loss.  He has had no fevers, chills, nausea, vomiting, diarrhea, chest pain, abdominal pain, palpitations, swelling, headaches, numbness, tingling or focal weakness.  In the ED he was found to have significant orthostatic vitals signs, his scalp laceration was closed with 3 staples and he received 1L NS.  He states he is feeling well except for the bump on his head.  "

## 2025-01-16 NOTE — H&P
"  [facility] Medicine  History & Physical    Patient Name: Bulmaro Tabares  MRN: [id number]  Patient Class: IP- Inpatient  Admission Date: 1/16/2025  Attending Physician: Thiago Nix MD  Primary Care Provider: Scarlett, Primary Doctor         Patient information was obtained from patient, relative(s), past medical records, and ER records.     Subjective:     Principal Problem:Orthostatic syncope    Chief Complaint:   Chief Complaint   Patient presents with    Fall     EMS activated for trip and fall, + head trauma, lac to top of head, not on blood thinners. Family reports more frequent falls.        HPI: Mr. 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  He has chronic dysarthria and lives with his sister, Stacia [phone] who is his medical power of .  History is primary obtained from her.  He had his last chemotherapy last month and since then has been doing well.  About a week ago he started having falls and has 4 falls in the last week.  This morning, he had another fall and hit the back of his head on the dresser.  He reports feeling light headed shortly after he started walking today.  He suffered a laceration and his sister reports significant bleeding at home.  She reports that his balance has been "a bit off" for quite some time, but overall has appeared stable.  She reports that his last seizure was more than a decade ago.  She states he has been eating and drinking well.  He has been thin his entire life and they deny any decreased oral consumption of food or water and also deny any recent weight loss.  He has had no fevers, chills, nausea, vomiting, diarrhea, chest pain, abdominal pain, palpitations, swelling, headaches, numbness, tingling or focal weakness.  In the ED he was found " to have significant orthostatic vitals signs, his scalp laceration was closed with 3 staples and he received 1L NS.  He states he is feeling well except for the bump on his head.    Past Medical History:   Diagnosis Date    Bipolar disorder     Hyponatremia     Laryngeal cancer     Lung cancer     Schizoaffective disorder     Seizure disorder        History reviewed. No pertinent surgical history.    Review of patient's allergies indicates:  No Known Allergies    No current facility-administered medications on file prior to encounter.     Current Outpatient Medications on File Prior to Encounter   Medication Sig    albuterol (PROVENTIL/VENTOLIN HFA) 90 mcg/actuation inhaler Inhale 2 puffs into the lungs every 4 (four) hours as needed for Shortness of Breath.    benztropine (COGENTIN) 1 MG tablet Take 1 tablet by mouth 2 (two) times daily.    carBAMazepine (TEGRETOL) 200 mg tablet Take 1 tablet (200 mg total) by mouth every morning.    carBAMazepine (TEGRETOL) 200 mg tablet Take 2 tablets (400 mg total) by mouth every evening.    risperiDONE (RISPERDAL) 3 MG Tab Take 1 tablet by mouth 2 (two) times daily.     Family History       Problem Relation (Age of Onset)    Asthma Mother    Diabetes Sister, Brother    Heart attack Father    Heart disease Father    Hypertension Brother    Seizures Sister          Tobacco Use    Smoking status: Former     Current packs/day: 0.00     Types: Cigarettes     Quit date: 6/15/2024     Years since quittin.5    Smokeless tobacco: Not on file   Substance and Sexual Activity    Alcohol use: No    Drug use: No    Sexual activity: Not on file     Review of Systems   All other systems reviewed and are negative.    Objective:     Vital Signs (Most Recent):  Temp: 97.7 °F (36.5 °C) (25 0912)  Pulse: 65 (25 1202)  Resp: 20 (25 0910)  BP: (!) 146/78 (25 1202)  SpO2: 100 % (25 1202) Vital Signs (24h Range):  Temp:  [97.7 °F (36.5 °C)] 97.7 °F (36.5 °C)  Pulse:   [65-90] 65  Resp:  [20] 20  SpO2:  [98 %-100 %] 100 %  BP: ()/(62-89) 146/78        There is no height or weight on file to calculate BMI.     Physical Exam  Vitals and nursing note reviewed.   Constitutional:       General: He is not in acute distress.     Appearance: He is well-developed. He is not ill-appearing or toxic-appearing.      Comments: Appears thin and mallnourished   HENT:      Head: Normocephalic.      Comments: 2cm posterior scalp laceration with 3 staples     Right Ear: External ear normal.      Left Ear: External ear normal.      Nose: Nose normal.      Mouth/Throat:      Mouth: Mucous membranes are dry.      Comments: Tongue has thick white material - suspect thrush  Eyes:      Extraocular Movements: Extraocular movements intact.   Cardiovascular:      Rate and Rhythm: Normal rate and regular rhythm.      Heart sounds: Normal heart sounds. No murmur heard.  Pulmonary:      Effort: Pulmonary effort is normal. No respiratory distress.      Breath sounds: Normal breath sounds.   Abdominal:      General: Bowel sounds are normal. There is no distension.      Palpations: Abdomen is soft.      Tenderness: There is no abdominal tenderness.   Musculoskeletal:         General: Normal range of motion.      Cervical back: Normal range of motion. No rigidity.      Right lower leg: No edema.      Left lower leg: No edema.   Skin:     General: Skin is warm and dry.   Neurological:      Mental Status: He is alert and oriented to person, place, and time. Mental status is at baseline.      Comments: Chronic dysarthria - at baseline per his sister.     Psychiatric:         Mood and Affect: Mood normal.         Behavior: Behavior normal.                Significant Labs: All pertinent labs within the past 24 hours have been reviewed.    Significant Imaging: I have reviewed all pertinent imaging results/findings within the past 24 hours.  Assessment/Plan:     * Orthostatic syncope  -Admitted to inpatient  status  -Presented after 4th fall in last week.  Today was associated with light headedness and traumatic syncope.  -Head CT and CT c-spine without evidence of hemorrhage or fractures  -Positive orthostatics.  Systolic bp 131 laying and only 85 standing  -Na is low and he does appear hypovolemic on exam with dry lips and skin.  -EKG shows NSR.  -Monitor on telemetry and check echocardiogram  -Will give an additional liter of NS today and ask for nephrology for their input regarding his hyponatremia.    -Fall and seizure precautions ordered.  Will consult PT/OT    Hyponatremia  -Patient with at least 2 prior hospitalizations with notably hyponatremia.  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  His malignancy may also play a role as could adrenal insufficiency.  -On admit he appears hypovolemic, is orthostatic and Na is 127.  Received 1L NS in ED.  -Noted prior AM cortisol 8/2024 was only 8.1 - do not see that ACTH stimulation test was performed.  -Check urine Na, urine osm and serum osm  -Check AM cortisol and TSH.    -Consult nephrology for assistance.  -Consult neurology for consideration of choosing alternative anti-epileptic agent.  -Will give 1 additional liter NS over next 10 hours.  Suspect will ultimately need to pursue fluid restriction.  -Repeat labs at 4PM this afternoon and again in AM.    Seizure disorder  -No seizure in more than a decade and has been on carbemazepine for many years now.  -Given recurrent hyponatremia which may be related to carbemazepine will consult neurology for evaluation of possibly changing to alternative medication.  -Seizure precautions ordered.    Primary cancer of left lower lobe of lung  -This is stage IIIa squamous cell carcinoma of left lower lung and he is followed by Dr. Srinivas Dempsey ([facility]) and Dr.Kendra Marmolejo ([facility]).  I reviewed  the last clinic notes from both of these.  -His therapy is intended to be curative. He completed chemoradiotherapy with weekly carbo/taxol to 60Gy on 11/15/24 and transitioned successfully to adjuvant durvalumab which is planned to continue for 1 year.  His last dose was received 12/2024 per his sister.  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    Malnutrition of moderate degree  -Patient appears thin and frail, but sister reports he has always been thin and has good oral intake without recent weight loss  -Measurements:  Wt Readings from Last 1 Encounters:   12/11/24 49.9 kg (110 lb)   There is no height or weight on file to calculate BMI.  -Add boost shakes  -Treat for possible oral thrush with nystatin swish and swallow  -Consult nutrition    Debility  -Fall precautions in place  -Consult pt/ot for evaluation and recommendations    Schizoaffective disorder  -History noted and is well controlled  -Continue home cogentin and risperidone.  -Delirium precautions ordered    Bipolar disorder  -History noted and well controlled  -Continue home cogentin and risperidone      VTE Risk Mitigation (From admission, onward)           Ordered     enoxaparin injection 40 mg  Daily         01/16/25 1246     IP VTE HIGH RISK PATIENT  Once         01/16/25 1246     Place sequential compression device  Until discontinued         01/16/25 1246                                    Thiago Nix MD  Department of Hospital Medicine  [facility] Emergency Dept

## 2025-01-17 LAB
ANION GAP SERPL CALC-SCNC: 8 MMOL/L (ref 8–16)
BASOPHILS # BLD AUTO: 0.01 K/UL (ref 0–0.2)
BASOPHILS NFR BLD: 0.4 % (ref 0–1.9)
BUN SERPL-MCNC: 11 MG/DL (ref 8–23)
CALCIUM SERPL-MCNC: 8.1 MG/DL (ref 8.7–10.5)
CHLORIDE SERPL-SCNC: 101 MMOL/L (ref 95–110)
CO2 SERPL-SCNC: 23 MMOL/L (ref 23–29)
CORTIS SERPL-MCNC: 9.4 UG/DL
CREAT SERPL-MCNC: 0.8 MG/DL (ref 0.5–1.4)
DIFFERENTIAL METHOD BLD: ABNORMAL
EOSINOPHIL # BLD AUTO: 0 K/UL (ref 0–0.5)
EOSINOPHIL NFR BLD: 0 % (ref 0–8)
ERYTHROCYTE [DISTWIDTH] IN BLOOD BY AUTOMATED COUNT: 12.6 % (ref 11.5–14.5)
EST. GFR  (NO RACE VARIABLE): >60 ML/MIN/1.73 M^2
GLUCOSE SERPL-MCNC: 93 MG/DL (ref 70–110)
HCT VFR BLD AUTO: 26 % (ref 40–54)
HGB BLD-MCNC: 8.6 G/DL (ref 14–18)
IMM GRANULOCYTES # BLD AUTO: 0.01 K/UL (ref 0–0.04)
IMM GRANULOCYTES NFR BLD AUTO: 0.4 % (ref 0–0.5)
LYMPHOCYTES # BLD AUTO: 0.3 K/UL (ref 1–4.8)
LYMPHOCYTES NFR BLD: 12.1 % (ref 18–48)
MAGNESIUM SERPL-MCNC: 1.9 MG/DL (ref 1.6–2.6)
MCH RBC QN AUTO: 30.9 PG (ref 27–31)
MCHC RBC AUTO-ENTMCNC: 33.1 G/DL (ref 32–36)
MCV RBC AUTO: 94 FL (ref 82–98)
MONOCYTES # BLD AUTO: 0.3 K/UL (ref 0.3–1)
MONOCYTES NFR BLD: 12.5 % (ref 4–15)
NEUTROPHILS # BLD AUTO: 2 K/UL (ref 1.8–7.7)
NEUTROPHILS NFR BLD: 74.6 % (ref 38–73)
NRBC BLD-RTO: 0 /100 WBC
PLATELET # BLD AUTO: 214 K/UL (ref 150–450)
PMV BLD AUTO: 8.7 FL (ref 9.2–12.9)
POCT GLUCOSE: 113 MG/DL (ref 70–110)
POCT GLUCOSE: 121 MG/DL (ref 70–110)
POCT GLUCOSE: 95 MG/DL (ref 70–110)
POTASSIUM SERPL-SCNC: 4.1 MMOL/L (ref 3.5–5.1)
RBC # BLD AUTO: 2.78 M/UL (ref 4.6–6.2)
SODIUM SERPL-SCNC: 132 MMOL/L (ref 136–145)
WBC # BLD AUTO: 2.72 K/UL (ref 3.9–12.7)

## 2025-01-17 PROCEDURE — 97165 OT EVAL LOW COMPLEX 30 MIN: CPT

## 2025-01-17 PROCEDURE — 97535 SELF CARE MNGMENT TRAINING: CPT

## 2025-01-17 PROCEDURE — 21400001 HC TELEMETRY ROOM

## 2025-01-17 PROCEDURE — 85025 COMPLETE CBC W/AUTO DIFF WBC: CPT | Performed by: HOSPITALIST

## 2025-01-17 PROCEDURE — 97162 PT EVAL MOD COMPLEX 30 MIN: CPT

## 2025-01-17 PROCEDURE — 25000003 PHARM REV CODE 250: Performed by: HOSPITALIST

## 2025-01-17 PROCEDURE — 63600175 PHARM REV CODE 636 W HCPCS: Performed by: HOSPITALIST

## 2025-01-17 PROCEDURE — 82533 TOTAL CORTISOL: CPT | Performed by: HOSPITALIST

## 2025-01-17 PROCEDURE — 97116 GAIT TRAINING THERAPY: CPT

## 2025-01-17 PROCEDURE — 25000003 PHARM REV CODE 250: Performed by: INTERNAL MEDICINE

## 2025-01-17 PROCEDURE — 80048 BASIC METABOLIC PNL TOTAL CA: CPT | Performed by: HOSPITALIST

## 2025-01-17 PROCEDURE — 36415 COLL VENOUS BLD VENIPUNCTURE: CPT | Performed by: HOSPITALIST

## 2025-01-17 PROCEDURE — 83735 ASSAY OF MAGNESIUM: CPT | Performed by: HOSPITALIST

## 2025-01-17 RX ORDER — ENOXAPARIN SODIUM 100 MG/ML
30 INJECTION SUBCUTANEOUS EVERY 24 HOURS
Status: DISCONTINUED | OUTPATIENT
Start: 2025-01-17 | End: 2025-01-18 | Stop reason: HOSPADM

## 2025-01-17 RX ADMIN — BENZTROPINE MESYLATE 1 MG: 1 TABLET ORAL at 04:01

## 2025-01-17 RX ADMIN — ENOXAPARIN SODIUM 30 MG: 30 INJECTION SUBCUTANEOUS at 04:01

## 2025-01-17 RX ADMIN — LEVETIRACETAM 500 MG: 500 TABLET, FILM COATED ORAL at 08:01

## 2025-01-17 RX ADMIN — NYSTATIN 500000 UNITS: 100000 SUSPENSION ORAL at 08:01

## 2025-01-17 RX ADMIN — NYSTATIN 500000 UNITS: 100000 SUSPENSION ORAL at 04:01

## 2025-01-17 RX ADMIN — NYSTATIN 500000 UNITS: 100000 SUSPENSION ORAL at 01:01

## 2025-01-17 RX ADMIN — SODIUM CHLORIDE 2000 MG: 1 TABLET ORAL at 08:01

## 2025-01-17 RX ADMIN — RISPERIDONE 3 MG: 1 TABLET, FILM COATED ORAL at 08:01

## 2025-01-17 RX ADMIN — BENZTROPINE MESYLATE 1 MG: 1 TABLET ORAL at 06:01

## 2025-01-17 RX ADMIN — CARBAMAZEPINE 200 MG: 200 TABLET, EXTENDED RELEASE ORAL at 08:01

## 2025-01-17 NOTE — ASSESSMENT & PLAN NOTE
-Patient appears thin and frail, but sister reports he has always been thin and has good oral intake without recent weight loss  -Measurements:  Wt Readings from Last 1 Encounters:   01/17/25 48.6 kg (107 lb 2.3 oz)   Body mass index is 17.83 kg/m².  -Add boost shakes  -Treat for possible oral thrush with nystatin swish and swallow  -Consult nutrition

## 2025-01-17 NOTE — PROGRESS NOTES
"[facility] Medicine  Progress Note    Patient Name: Bulmaro Tabares  MRN: [id number]  Patient Class: IP- Inpatient   Admission Date: 1/16/2025  Length of Stay: 1 days  Attending Physician: Thiago Nix MD  Primary Care Provider: Scarlett, Primary Doctor        Subjective     Principal Problem:Orthostatic syncope        HPI:  Mr. 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  He has chronic dysarthria and lives with his sister, Stacia [phone] who is his medical power of .  History is primary obtained from her.  He had his last chemotherapy last month and since then has been doing well.  About a week ago he started having falls and has 4 falls in the last week.  This morning, he had another fall and hit the back of his head on the dresser.  He reports feeling light headed shortly after he started walking today.  He suffered a laceration and his sister reports significant bleeding at home.  She reports that his balance has been "a bit off" for quite some time, but overall has appeared stable.  She reports that his last seizure was more than a decade ago.  She states he has been eating and drinking well.  He has been thin his entire life and they deny any decreased oral consumption of food or water and also deny any recent weight loss.  He has had no fevers, chills, nausea, vomiting, diarrhea, chest pain, abdominal pain, palpitations, swelling, headaches, numbness, tingling or focal weakness.  In the ED he was found to have significant orthostatic vitals signs, his scalp laceration was closed with 3 staples and he received 1L NS.  He states he is feeling well except for the bump on his head.    Overview/Hospital Course:  No notes on file    Interval History: No acute events overnight.  He is " feeling OK.  Sister at bedside and all questions answered.  Plan to monitor an additional 24 hours to ensure safe, seizure free, transition to keppra.  All questions answered and patient had no further complaints.    Objective:     Vital Signs (Most Recent):  Temp: 98 °F (36.7 °C) (01/17/25 1118)  Pulse: 78 (01/17/25 1118)  Resp: 15 (01/17/25 1118)  BP: 101/66 (01/17/25 1118)  SpO2: 99 % (01/17/25 1118) Vital Signs (24h Range):  Temp:  [97.5 °F (36.4 °C)-98.1 °F (36.7 °C)] 98 °F (36.7 °C)  Pulse:  [] 78  Resp:  [15-18] 15  SpO2:  [95 %-100 %] 99 %  BP: (100-137)/(61-84) 101/66     Weight: 48.6 kg (107 lb 2.3 oz)  Body mass index is 17.83 kg/m².    Intake/Output Summary (Last 24 hours) at 1/17/2025 1342  Last data filed at 1/17/2025 0841  Gross per 24 hour   Intake 2005 ml   Output 1750 ml   Net 255 ml         Physical Exam  Vitals and nursing note reviewed.   Constitutional:       General: He is not in acute distress.     Appearance: He is well-developed. He is not ill-appearing or toxic-appearing.      Comments: Appears thin and mallnourished   HENT:      Head: Normocephalic.      Comments: 2cm posterior scalp laceration with 3 staples     Right Ear: External ear normal.      Left Ear: External ear normal.      Nose: Nose normal.      Mouth/Throat:      Mouth: Mucous membranes are dry.      Comments: Tongue has thick white material - suspect thrush  Eyes:      Extraocular Movements: Extraocular movements intact.   Cardiovascular:      Rate and Rhythm: Normal rate and regular rhythm.      Heart sounds: Normal heart sounds. No murmur heard.  Pulmonary:      Effort: Pulmonary effort is normal. No respiratory distress.      Breath sounds: Normal breath sounds.   Abdominal:      General: Bowel sounds are normal. There is no distension.      Palpations: Abdomen is soft.      Tenderness: There is no abdominal tenderness.   Musculoskeletal:         General: Normal range of motion.      Cervical back: Normal range of  motion. No rigidity.      Right lower leg: No edema.      Left lower leg: No edema.   Skin:     General: Skin is warm and dry.   Neurological:      Mental Status: He is alert and oriented to person, place, and time. Mental status is at baseline.      Comments: Chronic dysarthria - at baseline per his sister.     Psychiatric:         Mood and Affect: Mood normal.         Behavior: Behavior normal.             Significant Labs: All pertinent labs within the past 24 hours have been reviewed.    Significant Imaging: I have reviewed all pertinent imaging results/findings within the past 24 hours.    Assessment and Plan     * Orthostatic syncope  -Admitted to inpatient status  -Presented after 4th fall in last week.  Today was associated with light headedness and traumatic syncope.  -Head CT and CT c-spine without evidence of hemorrhage or fractures  -Positive orthostatics.  Systolic bp 131 laying and only 85 standing  -Na is low and he does appear hypovolemic on exam with dry lips and skin.  -EKG shows NSR.  -Echo showed Ef 65-70%, normal diastolic function, no AS and no WMA  -No arrhythmias on telemetry thus far  -Suspect due to dehydration.  -Repeat orthostatic vitals today dramatically improved and no symptoms  -Needs to continue salt tabs and strict fluid restriction 500cc daily  -Fall and seizure precautions ordered.  PT/OT consulted and recommend home health at discharge  -Will monitor another 24 hours to ensure seizure free transition from carbemazepime to keppra and if does well then plan discharge home tomorrow.    Hyponatremia  -Patient with at least 2 prior hospitalizations with notably hyponatremia.  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  His malignancy may also play a role as could adrenal insufficiency.  -On admit he appears hypovolemic, is orthostatic and Na is 127.  Received 1L NS in ED.  -Noted prior AM  cortisol 8/2024 was only 8.1 - do not see that ACTH stimulation test was performed.  -Nephrology consulted and input appreciated.  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  -Na has corrected nicely with IV fluids and no need for further workup per nephrology  -Continue 2g NaCl bid   -Repeat labs in AM    Seizure disorder  -No seizure in more than a decade and has been on carbemazepine for many years now.  -Given recurrent hyponatremia which we suspect is related to carbemazepine I consulted neurology for evaluation of possibly changing to alternative medication.  Recommended transition from carbemazepine to keppra with specific transition protocol which has been enacted  -Continue keppra now  -Monitor for 24hours to ensure no seizures prior to discharge  -Seizure precautions ordered.    Primary cancer of left lower lobe of lung  -This is stage IIIa squamous cell carcinoma of left lower lung and he is followed by Dr. Srinivas Dempsey ([facility]) and Dr.Kendra Marmolejo ([facility]).  I reviewed the last clinic notes from both of these.  -His therapy is intended to be curative. He completed chemoradiotherapy with weekly carbo/taxol to 60Gy on 11/15/24 and transitioned successfully to adjuvant durvalumab which is planned to continue for 1 year.  His last dose was received 12/2024 per his sister.  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    Malnutrition of moderate degree  -Patient appears thin and frail, but sister reports he has always been thin and has good oral intake without recent weight loss  -Measurements:  Wt Readings from Last 1 Encounters:   01/17/25 48.6 kg (107 lb 2.3 oz)   Body mass index is 17.83 kg/m².  -Add boost shakes  -Treat for possible oral thrush with nystatin swish and swallow  -Consult  nutrition    Debility  -Fall precautions in place  -Consulted pt/ot for evaluation and recommendations - recommended home health at discharge    Schizoaffective disorder  -History noted and is well controlled  -Continue home cogentin and risperidone.  -Delirium precautions ordered    Bipolar disorder  -History noted and well controlled  -Continue home cogentin and risperidone      VTE Risk Mitigation (From admission, onward)           Ordered     enoxaparin injection 30 mg  Daily         01/17/25 0648     IP VTE HIGH RISK PATIENT  Once         01/16/25 1246     Place sequential compression device  Until discontinued         01/16/25 1246                    Discharge Planning   DELBERT:      Code Status: Full Code   Medical Readiness for Discharge Date:   Discharge Plan A: Home with family                Please place Justification for DME        Thiago Nix MD  Department of Hospital Medicine   [facility] ([place])

## 2025-01-17 NOTE — ASSESSMENT & PLAN NOTE
-Fall precautions in place  -Consulted pt/ot for evaluation and recommendations - recommended home health at discharge

## 2025-01-17 NOTE — ASSESSMENT & PLAN NOTE
-Admitted to inpatient status  -Presented after 4th fall in last week.  Today was associated with light headedness and traumatic syncope.  -Head CT and CT c-spine without evidence of hemorrhage or fractures  -Positive orthostatics.  Systolic bp 131 laying and only 85 standing  -Na is low and he does appear hypovolemic on exam with dry lips and skin.  -EKG shows NSR.  -Echo showed Ef 65-70%, normal diastolic function, no AS and no WMA  -No arrhythmias on telemetry thus far  -Suspect due to dehydration.  -Repeat orthostatic vitals today dramatically improved and no symptoms  -Needs to continue salt tabs and strict fluid restriction 500cc daily  -Fall and seizure precautions ordered.  PT/OT consulted and recommend home health at discharge  -Will monitor another 24 hours to ensure seizure free transition from carbemazepime to keppra and if does well then plan discharge home tomorrow.

## 2025-01-17 NOTE — PLAN OF CARE
01/17/25 1100   Rounds   Attendance Provider;Nurse ;Charge nurse;Physical therapist;Occupational therapist   Discharge Plan A Home with family   Why the patient remains in the hospital Requires continued medical care   Transition of Care Barriers None

## 2025-01-17 NOTE — SUBJECTIVE & OBJECTIVE
Interval History: No acute events overnight.  He is feeling OK.  Sister at bedside and all questions answered.  Plan to monitor an additional 24 hours to ensure safe, seizure free, transition to keppra.  All questions answered and patient had no further complaints.    Objective:     Vital Signs (Most Recent):  Temp: 98 °F (36.7 °C) (01/17/25 1118)  Pulse: 78 (01/17/25 1118)  Resp: 15 (01/17/25 1118)  BP: 101/66 (01/17/25 1118)  SpO2: 99 % (01/17/25 1118) Vital Signs (24h Range):  Temp:  [97.5 °F (36.4 °C)-98.1 °F (36.7 °C)] 98 °F (36.7 °C)  Pulse:  [] 78  Resp:  [15-18] 15  SpO2:  [95 %-100 %] 99 %  BP: (100-137)/(61-84) 101/66     Weight: 48.6 kg (107 lb 2.3 oz)  Body mass index is 17.83 kg/m².    Intake/Output Summary (Last 24 hours) at 1/17/2025 1342  Last data filed at 1/17/2025 0841  Gross per 24 hour   Intake 2005 ml   Output 1750 ml   Net 255 ml         Physical Exam  Vitals and nursing note reviewed.   Constitutional:       General: He is not in acute distress.     Appearance: He is well-developed. He is not ill-appearing or toxic-appearing.      Comments: Appears thin and mallnourished   HENT:      Head: Normocephalic.      Comments: 2cm posterior scalp laceration with 3 staples     Right Ear: External ear normal.      Left Ear: External ear normal.      Nose: Nose normal.      Mouth/Throat:      Mouth: Mucous membranes are dry.      Comments: Tongue has thick white material - suspect thrush  Eyes:      Extraocular Movements: Extraocular movements intact.   Cardiovascular:      Rate and Rhythm: Normal rate and regular rhythm.      Heart sounds: Normal heart sounds. No murmur heard.  Pulmonary:      Effort: Pulmonary effort is normal. No respiratory distress.      Breath sounds: Normal breath sounds.   Abdominal:      General: Bowel sounds are normal. There is no distension.      Palpations: Abdomen is soft.      Tenderness: There is no abdominal tenderness.   Musculoskeletal:         General: Normal  range of motion.      Cervical back: Normal range of motion. No rigidity.      Right lower leg: No edema.      Left lower leg: No edema.   Skin:     General: Skin is warm and dry.   Neurological:      Mental Status: He is alert and oriented to person, place, and time. Mental status is at baseline.      Comments: Chronic dysarthria - at baseline per his sister.     Psychiatric:         Mood and Affect: Mood normal.         Behavior: Behavior normal.             Significant Labs: All pertinent labs within the past 24 hours have been reviewed.    Significant Imaging: I have reviewed all pertinent imaging results/findings within the past 24 hours.

## 2025-01-17 NOTE — PLAN OF CARE
AAO x 4 but his responses are a slower than what is his baseline.  Last Sodium level drawn noted at 129 ( up from 127).  Care plan reviewed with him and his significant other.  Skin warm and dry.  Body frame is thin.  No skin breakdown observed.  IV to the left AC with NS infusing at 100 cc/hr for ten hours noted.  Telemetry in place with Sinus Rhythm observed.  Speech is  clear but  slow at times.  Call light within reach.  Family member at the bedside.  Urinal within reach.  Rounding maintained per protocol.    Problem: Adult Inpatient Plan of Care  Goal: Plan of Care Review  Outcome: Progressing  Flowsheets (Taken 1/17/2025 0327)  Plan of Care Reviewed With:   patient   spouse     Problem: Adult Inpatient Plan of Care  Goal: Absence of Hospital-Acquired Illness or Injury  Intervention: Identify and Manage Fall Risk  Flowsheets (Taken 1/17/2025 0327)  Safety Promotion/Fall Prevention:   assistive device/personal item within reach   bed alarm set   commode/urinal/bedpan at bedside   Fall Risk reviewed with patient/family   family expresses understanding of fall risk and prevention   high risk medications identified   medications reviewed   lighting adjusted   instructed to call staff for mobility   nonskid shoes/socks when out of bed   side rails raised x 2     Problem: Adult Inpatient Plan of Care  Goal: Absence of Hospital-Acquired Illness or Injury  Intervention: Prevent Skin Injury  Flowsheets (Taken 1/17/2025 0327)  Body Position: position changed independently  Skin Protection: incontinence pads utilized  Device Skin Pressure Protection: absorbent pad utilized/changed     Problem: Skin Injury Risk Increased  Goal: Skin Health and Integrity  Outcome: Progressing  Intervention: Optimize Skin Protection  Flowsheets (Taken 1/17/2025 0327)  Skin Protection: incontinence pads utilized  Activity Management:   Rolling - L1   Sitting at edge of bed - L2     Problem: Thought Process Alteration  Goal: Optimal Thought  Clarity  Outcome: Progressing  Intervention: Minimize Safety Risk and Altered Thought  Flowsheets (Taken 1/17/2025 5317)  Sensory Stimulation Regulation:   care clustered   lighting decreased   quiet environment promoted  Enhanced Safety Measures:   bed alarm set   family to remain at bedside

## 2025-01-17 NOTE — ASSESSMENT & PLAN NOTE
-No seizure in more than a decade and has been on carbemazepine for many years now.  -Given recurrent hyponatremia which we suspect is related to carbemazepine I consulted neurology for evaluation of possibly changing to alternative medication.  Recommended transition from carbemazepine to keppra with specific transition protocol which has been enacted  -Continue keppra now  -Monitor for 24hours to ensure no seizures prior to discharge  -Seizure precautions ordered.

## 2025-01-17 NOTE — ASSESSMENT & PLAN NOTE
-Patient with at least 2 prior hospitalizations with notably hyponatremia.  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  His malignancy may also play a role as could adrenal insufficiency.  -On admit he appears hypovolemic, is orthostatic and Na is 127.  Received 1L NS in ED.  -Noted prior AM cortisol 8/2024 was only 8.1 - do not see that ACTH stimulation test was performed.  -Nephrology consulted and input appreciated.  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  -Na has corrected nicely with IV fluids and no need for further workup per nephrology  -Continue 2g NaCl bid   -Repeat labs in AM

## 2025-01-17 NOTE — PLAN OF CARE
Recommendations  1) Continue regular / liberalized diet as tolerated.   2) Honor food preferences    3) Encourage intake of meals and commercial beverages between meals    4) Consider MVI daily    5) RD to monitor and follow up    Goals:   1) Pt will tolerate > 75% intake of meals by RD follow up    2) No further weight loss with goal for weight gain  Nutrition Goal Status: new  Communication of RD Recs:  (POC)

## 2025-01-17 NOTE — PLAN OF CARE
Problem: Physical Therapy  Goal: Physical Therapy Goal  Description: Pt will meet the following goals in 2 weeks:  1. Ind with all bed mobility  2. Ascend/Descend 2 steps SBA for safely entering home  3. Amb 200' SBA with RW      Outcome: Progressing

## 2025-01-17 NOTE — CONSULTS
[facility] - [facility] ([facility])  Adult Nutrition  Consult Note    SUMMARY     Recommendations  1) Continue regular / liberalized diet as tolerated.   2) Honor food preferences    3) Encourage intake of meals and commercial beverages between meals    4) Consider MVI daily    5) RD to monitor and follow up    Goals:   1) Pt will tolerate > 75% intake of meals by RD follow up    2) No further weight loss with goal for weight gain  Nutrition Goal Status: new  Communication of RD Recs:  (POC)    Assessment and Plan    Endocrine  Malnutrition of moderate degree  Malnutrition Type:  Context: chronic illness, social/environmental circumstances  Level: moderate    Related to (etiology):   Increased nutrient needs d/t chronic catabolic illness    Signs and Symptoms (as evidenced by):   Cancer dx with cachexia   Underweight - Body mass index is 17.83 kg/m².  Physical signs of muscle and fat loss    Malnutrition Characteristic Summary:  Subcutaneous Fat (Malnutrition): mild depletion  Muscle Mass (Malnutrition): moderate depletion      Interventions/Recommendations (treatment strategy):  General healthful diet  Commercial beverages  Collaboration of care with other providers    Nutrition Diagnosis Status:   New         Malnutrition Assessment  Malnutrition Context: chronic illness, social/environmental circumstances  Malnutrition Level: moderate  Skin (Micronutrient): dry (Leobardo 18)  Teeth (Micronutrient): edentulous       Subcutaneous Fat (Malnutrition): mild depletion  Muscle Mass (Malnutrition): moderate depletion   Orbital Region (Subcutaneous Fat Loss): moderate depletion  Upper Arm Region (Subcutaneous Fat Loss): mild depletion   Yazidism Region (Muscle Loss): moderate depletion  Clavicle Bone Region (Muscle Loss): moderate depletion  Clavicle and Acromion Bone Region (Muscle Loss): severe depletion  Dorsal Hand (Muscle Loss): moderate depletion  Patellar Region (Muscle Loss): moderate depletion  Anterior Thigh Region (Muscle  "Loss): moderate depletion  Posterior Calf Region (Muscle Loss): moderate depletion                 Reason for Assessment    Reason For Assessment: consult (persistent hyponatremia and malnutrition - eval and recommendations)  Diagnosis:  (Orthostatic syncope)  General Information Comments: Pt presented for evalution of falls with syncope. Hx of LL lung squamous cell carcinoma, laryngeal cancer, recurrent hyponatremia. Pt receiving a regular diet, with 500 cc fluid restrictions. Oral nutrition supplement added to each meal tray. Tolerated 100% breakfast, sister at bedside confimed pt with good appetite. No changes to his intake. Denied recent weight loss, and states he has always been petite. Per chart review, pt never above 120lb (). UBW 110lb x 1 month ago. Pt denied N/V/C/D, no difficulty chewing or swallowing (missing teeth). PIV. Leobardo 18-skin intact. NFPE completed pt meets moderate malnutrition in context of chronic illness r/t increased nutrient needs d/t chronic catabolic illness aeb underweight with BMI 17.8, cancer dx with physical signs of muscle wasting and fat loss.  Nutrition Discharge Planning: dc on general healthful diet    Nutrition Related Social Determinants of Health: SDOH: Adequate food in home environment    Nutrition/Diet History    Spiritual, Cultural Beliefs, Pentecostal Practices, Values that Affect Care: no  Food Allergies: NKFA  Factors Affecting Nutritional Intake: None identified at this time    Anthropometrics    Height: 5' 5" (165.1 cm)  Height (inches): 65 in  Weight: 48.6 kg (107 lb 2.3 oz)  Weight (lb): 107.14 lb  Weight Method: Infant Scale  Ideal Body Weight (IBW), Male: 136 lb  % Ideal Body Weight, Male (lb): 78.78 %  % Ideal Body Weight Malnutrition: 70-79%: moderate deficit  BMI (Calculated): 17.8  BMI Grade: less than 18.5 - underweight  Weight Loss: unintentional  Usual Body Weight (UBW), k kg (2024)  % Usual Body Weight: 97.4  % Weight Change From Usual Weight: " -2.8 %       Lab/Procedures/Meds    Pertinent Labs Reviewed: reviewed  Pertinent Labs Comments: Na 312(L); Ca 8.1(L); albumin 2.4(L); osmolality 273(L)  Pertinent Medications Reviewed: reviewed  Scheduled Meds:   benztropine  1 mg Oral Daily    And    benztropine  1 mg Oral Daily    enoxparin  30 mg Subcutaneous Daily    levETIRAcetam  500 mg Oral BID    nystatin  500,000 Units Oral QID    risperiDONE  3 mg Oral Daily    And    risperiDONE  3 mg Oral Daily    sodium chloride  2,000 mg Oral BID     Continuous Infusions:  PRN Meds:.  Current Facility-Administered Medications:     acetaminophen, 650 mg, Oral, Q6H PRN    dextrose 50%, 12.5 g, Intravenous, PRN    dextrose 50%, 25 g, Intravenous, PRN    glucagon (human recombinant), 1 mg, Intramuscular, PRN    glucose, 16 g, Oral, PRN    glucose, 24 g, Oral, PRN    loperamide, 2 mg, Oral, QID PRN    melatonin, 6 mg, Oral, Nightly PRN    naloxone, 0.02 mg, Intravenous, PRN    ondansetron, 4 mg, Intravenous, Q4H PRN    sodium chloride 0.9%, 10 mL, Intravenous, PRN    Estimated/Assessed Needs    Weight Used For Calorie Calculations: 48.6 kg (107 lb 2.3 oz)  Energy Calorie Requirements (kcal): 1577  Energy Need Method: [place] (x 1.3)  Protein Requirements: 58g (1.2 g/kg)  Weight Used For Protein Calculations: 48.6 kg (107 lb 2.3 oz)  Fluid Requirements (mL): fluid restrict to no more than 500 mL fluid daily  Estimated Fluid Requirement Method:  (per MD orders)  RDA Method (mL): 1577     Wt Readings from Last 5 Encounters:   01/17/25 48.6 kg (107 lb 2.3 oz)   12/11/24 49.9 kg (110 lb)   08/25/24 50.2 kg (110 lb 10.7 oz)   08/11/24 49.9 kg (110 lb 0.2 oz)   11/21/16 54.8 kg (120 lb 14.4 oz)       Nutrition Prescription Ordered    Current Diet Order: Regular diet  Nutrition Order Comments: fluid restricted diet 500 mL  Oral Nutrition Supplement: Boost TID    Evaluation of Received Nutrient/Fluid Intake    % Kcal Needs: 50-75%  IV Fluid (mL): 2400 (0.9% NaCl  infusion)  I/O: + 1.3 L since admit  Energy Calories Required: meeting needs  Protein Required: meeting needs  Fluid Required: meeting needs  Comments: LBM: 1/15  Tolerance: tolerating  % Intake of Estimated Energy Needs: 50 - 75 %  % Meal Intake: 50 - 75 %    Nutrition Risk    Level of Risk/Frequency of Follow-up: moderate     Monitor and Evaluation    Food and Nutrient Intake: energy intake, food and beverage intake  Food and Nutrient Adminstration: diet order  Anthropometric Measurements: weight, weight change, body mass index  Biochemical Data, Medical Tests and Procedures: electrolyte and renal panel, gastrointestinal profile, inflammatory profile, glucose/endocrine profile, lipid profile  Nutrition-Focused Physical Findings: overall appearance       Nutrition Follow-Up    RD Follow-up?: Yes    Lindsay Gilbert RDN, JOSEFAN

## 2025-01-17 NOTE — PLAN OF CARE
Medicare Message     Important Message from Medicare regarding Discharge Appeal Rights Explained to patient/caregiver; Signed/date by patient/caregiver   Date IMM was signed 1/16/2025   Time IMM was signed 1113

## 2025-01-17 NOTE — ASSESSMENT & PLAN NOTE
Malnutrition Type:  Context: chronic illness, social/environmental circumstances  Level: moderate    Related to (etiology):   Increased nutrient needs d/t chronic catabolic illness    Signs and Symptoms (as evidenced by):   Cancer dx with cachexia   Underweight - Body mass index is 17.83 kg/m².  Physical signs of muscle and fat loss    Malnutrition Characteristic Summary:  Subcutaneous Fat (Malnutrition): mild depletion  Muscle Mass (Malnutrition): moderate depletion      Interventions/Recommendations (treatment strategy):  1) Continue regular / liberalized diet as tolerated. 2) Honor food preferences  3) Encourage intake of meals and commercial beverages between meals  4) Consider MVI daily  5) RD to monitor and follow up    Nutrition Diagnosis Status:   New

## 2025-01-17 NOTE — PT/OT/SLP EVAL
Occupational Therapy   Evaluation & Treatment    Name: Bulmaro Tabares  MRN: [id number]  Admitting Diagnosis: Orthostatic syncope  Recent Surgery: * No surgery found *      Recommendations:     Discharge Recommendations: Low Intensity Therapy (with continued family support and supervision)  Discharge Equipment Recommendations:  bath bench, walker, rolling  Barriers to discharge:  None    Assessment:     Bulmaro Tabares is a 64 y.o. male with a medical diagnosis of Orthostatic syncope.  He presents with mild pain near laceration site in posterior head from recent fall. Performance deficits affecting function: impaired balance, decreased safety awareness, weakness, impaired self care skills, impaired functional mobility, gait instability, decreased lower extremity function.  Pt agreeable to participating in therapy upon arrival to room.  Pt demonstrates strength and ROM in (B) UE needed for ADLs that is WFL and WNL respectively.  While seated in bed with legs propped up onto bed pt able to adjust socks and pull up with supervision.  While standing at sink during grooming task significant posterior lean noted requiring CGA, RW, and cues to adjust posture.      PTA pt and sister reports that pt was primarily independent with ADLs and mobility; however, he has been having several recent falls.  Pt would benefit from skilled OT services to address problems listed above and increase independence with ADLs.  Low intensity therapy (with continued family support) is recommended upon d/c from acute care to further address deficits and help pt improve overall functional independence.         Rehab Prognosis: Good; patient would benefit from acute skilled OT services to address these deficits and reach maximum level of function.       Plan:     Patient to be seen 4 x/week to address the above listed problems via self-care/home management, therapeutic activities, therapeutic exercises  Plan of Care Expires:    Plan of Care Reviewed with:  patient, family    Subjective     Chief Complaint: pain in head near laceration site  Patient/Family Comments/goals: return home    Occupational Profile:  Living Environment: Pt lives with sister in [facility], 2 little [place]RIST[place].  Bathroom has tub/shower.  Previous level of function:   ADLs: Independent  Mobility: Independent, but experiencing weakness  Sister reports several recent falls  Roles and Routines: brother, likes to watch TV  Does not drive; brother drives him to his appts  Equipment Used at Home: none  Assistance upon Discharge: sister able to provide assist; steps away briefly for errands but mostly home with pt    Pain/Comfort:  Pain Rating 1:  (pt reports some pain in head near laceration site (from recent fall))    Patients cultural, spiritual, Sikhism conflicts given the current situation: no    Objective:     Communicated with: RN (Dax) prior to session.  Patient found HOB elevated with telemetry, peripheral IV upon OT entry to room.  *sister present during session    General Precautions: Standard, fall  Orthopedic Precautions: N/A  Braces: N/A  Respiratory Status: Room air    Occupational Performance:    Bed Mobility:    Supine <> sit: supervision  Scooting: supervision  Sit <> supine: supervision     Functional Mobility/Transfers:  Sit <> stand: SBA, RW x 1 trial from EOB  Functional Mobility: Pt ambulated ~20 ft in room with CGA, RW.  Posterior lean noted  Mild impaired balance observed    Activities of Daily Living:  Grooming: contact guard assistance-SBA, RW for washing hands standing at sink.  Significant posterior lean noted  Cues provided to help pt adjust posture    Cognitive/Visual Perceptual:  Cognitive/Psychosocial Skills:    -       Oriented to: Person, Place, and date of birth   -       Follows Commands/attention: Follows 100% of one-step commands  -       Communication: somewhat difficult to distinguish speech at times  -       Memory: possible deficits  -       Safety awareness/insight  to disability: Impaired  -       Mood/Affect/Coping skills/emotional control: pleasant, cooperative; pt with consistent altering facial expressions during session    Physical Exam:  Postural examination/scapula alignment: No postural abnormalities identified  Skin integrity: Visible skin intact  Edema:  None noted  Sensation: -       Intact  Motor Planning: WNL  Dominant hand: Right  Upper Extremity Range of Motion:    -       Right Upper Extremity: WNL  -       Left Upper Extremity: WNL  Upper Extremity Strength:   -       Right Upper Extremity: WFL; grossly 4/5 all muscle groups  -       Left Upper Extremity: WFL; grossly 4/5 all muscle groups   Strength: WFL  Fine Motor Coordination: intact  Gross motor coordination: WFL  Balance: sitting- independent; standing- CGA    AMPAC 6 Click ADL:  AMPAC Total Score: 19    Treatment & Education:  *Pt educated on role of OT in acute care setting  *Pt performed sit <> stand transfer from EOB  *Pt ambulated within room to address coordination, endurance, and balance needed for functional mobility; cues for RW management provided  *Pt performed grooming task standing at sink; cues for RW placement and posture provided  *POC reviewed with pt    Patient left HOB elevated with all lines intact, call button in reach, bed alarm on, RN (Dax) and PT (Debo) notified, and sister present    GOALS:   Multidisciplinary Problems       Occupational Therapy Goals          Problem: Occupational Therapy    Goal Priority Disciplines Outcome Interventions   Occupational Therapy Goal     OT, PT/OT Progressing    Description: Goals to be met by: 1/30/2025     Patient will increase functional independence with ADLs by performing:    UE Dressing with [place].  LE Dressing with [place].  Grooming while standing at sink with Supervision.  Toileting from toilet with Stand-by Assistance for hygiene and clothing management.   Toilet transfer to toilet with Stand-by Assistance.                          DME Justifications:   Bulmaro's mobility limitation cannot be sufficiently resolved by the use of a cane. His functional mobility deficit can be sufficiently resolved with the use of a Rolling Walker. Patient's mobility limitation significantly impairs their ability to participate in one of more activities of daily living.  The use of a RW will significantly improve the patient's ability to participate in MRADLS and the patient will use it on regular basis in the home.    History:     Past Medical History:   Diagnosis Date    Bipolar disorder     Hyponatremia     Laryngeal cancer     Lung cancer     Schizoaffective disorder     Seizure disorder        History reviewed. No pertinent surgical history.    Time Tracking:     OT Date of Treatment: 01/17/25  OT Start Time: 0959  OT Stop Time: 1018  OT Total Time (min): 19 min    Billable Minutes:Evaluation 11  Self Care/Home Management 8    YONY Garner  1/17/2025

## 2025-01-17 NOTE — ASSESSMENT & PLAN NOTE
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 Srinivas Dempsey ([facility]) and Dr.Kendra Marmolejo ([facility]).  I reviewed the last clinic notes from both of these.  -His therapy is intended to be curative. He completed chemoradiotherapy with weekly carbo/taxol to 60Gy on 11/15/24 and transitioned successfully to adjuvant durvalumab which is planned to continue for 1 year.  His last dose was received 12/2024 per his sister.  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

## 2025-01-17 NOTE — PLAN OF CARE
Problem: Adult Inpatient Plan of Care  Goal: Plan of Care Review  Outcome: Progressing  Goal: Patient-Specific Goal (Individualized)  Outcome: Progressing  Goal: Absence of Hospital-Acquired Illness or Injury  Outcome: Progressing  Goal: Optimal Comfort and Wellbeing  Outcome: Progressing  Goal: Readiness for Transition of Care  Outcome: Progressing     Problem: Skin Injury Risk Increased  Goal: Skin Health and Integrity  Outcome: Progressing     Problem: Thought Process Alteration  Goal: Optimal Thought Clarity  Outcome: Progressing

## 2025-01-17 NOTE — PROGRESS NOTES
Consult Note  Nephrology    Consult Requested By: Thiago Nix MD  Reason for Consult: Recurrent hyponatrmeia    SUBJECTIVE:     History of Present Illness:  Patient is a 64 y.o. male presents with PMHx significant for bipolar disorder, seizure disorder, 50+ pack yr smoking history, newly diagnosed Squamous Cell Carcinoma of the LLL known to our group from a recent admission for hyponatremia due to a mixed picture with recurrent volume depletionfrom poor intake on chronic drug / Lung Ca induced SIADH (carbazmazepine) who presents to ED with LOC and hit his head upon getting out of bed this am. Pt noted to be significantly orthostatic with standing BP droping from 131/81 to 85/62. He has had multiple admission for the same and at last discharge was supposed to be on 500ml free water restriction and NaCl tabs 2g bid, but he was not taking these pills. Due to his intellectual disability his sister is responsible for his care. Serum Na 127 on presentation which is not far off of his baseline of 130 in 12/2024.  Previously the decision to continue carbamazepine was made due to the fact that his his seizures had been difficult control without carbamazepinemay have caused his hyponatremia and serum Na corrected easily with volume resuscitation.    Interval history:  Patient feeling much better today feels like himself able to walk around eating and drinking.  Sister at bedside and discussed with her that he must stay on his salt tablets and can not lift them run out.    Past Medical History:   Diagnosis Date    Bipolar disorder     Hyponatremia     Laryngeal cancer     Lung cancer     Schizoaffective disorder     Seizure disorder      History reviewed. No pertinent surgical history.  Family History   Problem Relation Name Age of Onset    Heart disease Father      Heart attack Father      Diabetes Sister      Seizures Sister      Hypertension Brother      Diabetes Brother      Asthma Mother       Social History      Tobacco Use    Smoking status: Former     Current packs/day: 0.00     Types: Cigarettes     Quit date: 6/15/2024     Years since quittin.5   Substance Use Topics    Alcohol use: No    Drug use: No       Medications Prior to Admission   Medication Sig Dispense Refill Last Dose/Taking    benztropine (COGENTIN) 1 MG tablet Take 1 tablet by mouth 2 (two) times daily.   2025    carBAMazepine (TEGRETOL) 200 mg tablet Take 1 tablet (200 mg total) by mouth every morning. (Patient taking differently: Take 400 mg by mouth every evening.)   2025    carBAMazepine (TEGRETOL) 200 mg tablet Take 2 tablets (400 mg total) by mouth every evening. (Patient taking differently: Take 600 mg by mouth every morning.)   1/15/2025    risperiDONE (RISPERDAL) 3 MG Tab Take 1 tablet by mouth 2 (two) times daily.   2025    albuterol (PROVENTIL/VENTOLIN HFA) 90 mcg/actuation inhaler Inhale 2 puffs into the lungs every 4 (four) hours as needed for Shortness of Breath.          Review of patient's allergies indicates:  No Known Allergies     Review of Systems:  Constitutional: No fever or chills, no weight changes.  Eyes: No visual changes or photophobia  HEENT: No nasal congestion or sore throat  Respiratory: No cough or shorness of breath  Cardiovascular: No chest pain or palpitations  Gastrointestinal: Good appetite, no nausea or vomiting, no change in bowel habits  Genitourinary: No hematuria or dysuria  Skin: No rash or pruritis  Hematologic/lymphatic: No easy bruising, bleeding or lymphadenopathy  Musculoskeletal: No arthralgias or myalgias  Neurological: No seizures or tremors  Endocrine: No heat/cold intolerance.  No polyuria/polydipsia.  Psychiatric:  No depression or anxiety.     OBJECTIVE:     Vital Signs (Most Recent)  Temp: 97.8 °F (36.6 °C) (25)  Pulse: 98 (25)  Resp: 16 (25)  BP: 121/80 (25)  SpO2: 98 % (25)    Vital Signs Range (Last 24H):  Temp:  [97.5 °F  "(36.4 °C)-98.1 °F (36.7 °C)]   Pulse:  []   Resp:  [16-20]   BP: ()/(61-89)   SpO2:  [95 %-100 %]     Physical Exam:    GEN:  Alert, thin but not cachectic  HEENT:  MARKO, No conjunctivitis, normal sclera, O/P clear, no oropharyngial lesions, no thrush, neck supple, No LAD, Nml JVD, no carotid bruits  CV:  RRR no MRG  Lungs:  CTAb, no rhonchi, wheeze, crackles, normal excursion  Abdomen: Obese, soft, NTND, no fluid wave, no HSM, NABS  Ext:  No CCE, normal DP pulses bilat  Neuro:  Non-Focal, EOMI, gait not assessed  Skin:  No rash, excoriation, petchiae    Diagnostic Results:  Labs: Reviewed    ASSESSMENT/PLAN:     Hypotonic Hyponatremia due to a mixed picture with recurrent volume depletion from poor intake on chronic drug (carbazmazepine) / Lung Ca induced SIADH   -Admit Na 127  -recent baseline 130  -He has been on NaCl tabs 2g bid as RX'd at last discharge due "ran out". Sister just "adding some salt to his food."  -Also appears again with significant volume depletion and orthostatic hypotension.  -Given NS 1L in ED  -Urine Osm/ lytes ordered and these will reflect post NS bolus values.  -send repeat stat Na now> 129  -Okay to continue carbamazepine or change to keppra  -Free water restriction 500ml/ daily  -would not engage in repeat GALARZA.  -resumed 2g NaCl bid and ensure fluid intake 2L with 500ml free water restrictionover next day with nursing.  -Na 132  -consider checking orthostatic prior to discharge.  -discussed today again with his sister and she expressed understanding that he needs to stay on NaCl tabs and not to run out.  -he may be discharge home from renal standpoint.    Thanks for consult. Will sign off, please re-consult prn.  Thank you    "

## 2025-01-17 NOTE — PT/OT/SLP EVAL
Physical Therapy Evaluation    Patient Name:  Bulmaro Tabares   MRN:  [id number]    Recommendations:     Discharge Recommendations: Low Intensity Therapy   Discharge Equipment Recommendations: walker, rolling, bath bench   Barriers to discharge: None    Assessment:     Bulmaro Tabares is a 64 y.o. male admitted with a medical diagnosis of Orthostatic syncope.  He presents with the following impairments/functional limitations: gait instability, impaired balance, decreased lower extremity function, impaired self care skills, decreased safety awareness .    Pt responded well to treatment and evaluation. Gait training was performed with RW with patients recent falls and hypotensive episodes. Gait deviations were compared with RW and without any AD. Pt demonstrated much more unstable gait without AD by increasing base of support when amb and waddling with toe in. Patient would benefit from skilled PT to address above stated deficits and return to PLF. Low Intensity Therapy recommended.     Rehab Prognosis: Good; patient would benefit from acute skilled PT services to address these deficits and reach maximum level of function.    Recent Surgery: * No surgery found *      Plan:     During this hospitalization, patient to be seen 4 x/week to address the identified rehab impairments via gait training, therapeutic activities, therapeutic exercises, neuromuscular re-education and progress toward the following goals:    Plan of Care Expires:  01/31/25    Subjective     Chief Complaint: Mixture of meds has sodium low and feeling weaker.   Patient/Family Comments/goals: Patient agreeable to PT treatment. Sister does not want the meds to get out of order and have a seizure since its been such a long time.     Pain/Comfort:  Pain Rating 1: 0/10    Patients cultural, spiritual, Denominational conflicts given the current situation: no    Living Environment:  Lives at home with sister with 2-3STE with hand rails on both sides.   Prior to  admission, patients level of function was ind.  Equipment used at home: none.  DME owned (not currently used): none.  Upon discharge, patient will have assistance from sister.    Objective:     Communicated with WOO [facility] prior to session.  Patient found supine with telemetry, peripheral IV, bed alarm  upon PT entry to room.    General Precautions: Standard, fall  Orthopedic Precautions:N/A   Braces: N/A  Respiratory Status: Room air  Patient donned non slip socks and gait belt for OOB mobility.    Exams:  Cognitive Exam:  Patient is oriented to Person and Place  Sensation:    -       Intact  RLE ROM: WFL  RLE Strength: WFL  LLE ROM: WFL  LLE Strength: WFL    Functional Mobility:  Bed Mobility:     Supine to Sit: stand by assistance  Pt did not use any features of the hospital bed, but the HOB was elevated  Transfers:     Sit to Stand:  stand by assistance with rolling walker  Gait: Amb 100' with SBA and RW. Pt required cues to push the walker and did not need to be picked up. Gait pattern demonstrates proper heel strike and toe off.   Amb 30' SBA without AD. Pt demonstrates an increased LAURENCE and toe in created a waddling gait pattern. Gait speed significantly decreased and looked more unsteady.       AM-PAC 6 CLICK MOBILITY  Total Score:20       Treatment & Education:  Role of PT and POC  See above functional mobility     Patient left supine with all lines intact, call button in reach, and bed alarm on.    GOALS:   Multidisciplinary Problems       Physical Therapy Goals          Problem: Physical Therapy    Goal Priority Disciplines Outcome Interventions   Physical Therapy Goal     PT, PT/OT Progressing    Description: Pt will meet the following goals in 2 weeks:  1. Ind with all bed mobility  2. Ascend/Descend 2 steps SBA for safely entering home  3. Amb 200' SBA with RW                           DME Justifications:   Bulmaro's mobility limitation cannot be sufficiently resolved by the use of a cane. His  functional mobility deficit can be sufficiently resolved with the use of a Rolling Walker. Patient's mobility limitation significantly impairs their ability to participate in one of more activities of daily living.  The use of a RW will significantly improve the patient's ability to participate in MRADLS and the patient will use it on regular basis in the home.    History:     Past Medical History:   Diagnosis Date    Bipolar disorder     Hyponatremia     Laryngeal cancer     Lung cancer     Schizoaffective disorder     Seizure disorder        History reviewed. No pertinent surgical history.    Time Tracking:     PT Received On: 01/17/25  PT Start Time: 1045     PT Stop Time: 1105  PT Total Time (min): 20 min     Billable Minutes: Evaluation 10 and Gait Training 10      01/17/2025

## 2025-01-17 NOTE — ASSESSMENT & PLAN NOTE
-History noted and is well controlled  -Continue home cogentin and risperidone.  -Delirium precautions ordered

## 2025-01-17 NOTE — PLAN OF CARE
Problem: Occupational Therapy  Goal: Occupational Therapy Goal  Description: Goals to be met by: 1/30/2025     Patient will increase functional independence with ADLs by performing:    UE Dressing with [place].  LE Dressing with [place].  Grooming while standing at sink with Supervision.  Toileting from toilet with Stand-by Assistance for hygiene and clothing management.   Toilet transfer to toilet with Stand-by Assistance.    Outcome: Progressing     OT evaluation complete and POC established.  Low intensity (with continued family support and supervision) is recommended upon d/c from acute care to further address deficits and help pt improve overall functional independence.     YONY Garner  1/17/2025

## 2025-01-17 NOTE — PLAN OF CARE
Case Management Assessment     PCP: [facility]  Pharmacy: [facility]    Patient Arrived From: Home  Existing Help at Home: Independent/Sister Stacia Tabares [phone]     Barriers to Discharge: None    Discharge Plan:    A. Home with family   B. Home    A&O x 4 with delayed responses, sister Stacia completed assessment with CM; PCP is at [facility]; Independent with ADL's; Does not have nor need DME.     01/17/25 0802   Discharge Assessment   Assessment Type Discharge Planning Assessment   Confirmed/corrected address, phone number and insurance Yes   Confirmed Demographics Correct on Facesheet   Source of Information family   Communicated DELBERT with patient/caregiver Yes   Reason For Admission orthostatic syncope   People in Home sibling(s)   Facility Arrived From: home   Do you expect to return to your current living situation? Yes   Do you have help at home or someone to help you manage your care at home? Yes   Who are your caregiver(s) and their phone number(s)? Stacia Tabares//[phone]   Prior to hospitilization cognitive status: Alert/Oriented   Current cognitive status: Alert/Oriented   Walking or Climbing Stairs Difficulty no   Dressing/Bathing Difficulty no   Equipment Currently Used at Home none   Readmission within 30 days? No   Patient currently being followed by outpatient case management? No   Do you currently have service(s) that help you manage your care at home? No   Do you take prescription medications? Yes   Do you have prescription coverage? Yes   Coverage AETNA MANAGED MEDICARE - AETNA MEDICARE DUAL DSNP   Do you have any problems affording any of your prescribed medications? No   Is the patient taking medications as prescribed? yes   Who is going to help you get home at discharge? Stacia Tabares/sister/[phone]   How do you get to doctors appointments? health plan transportation   Are you on dialysis? No   Do you take coumadin? No    Discharge Plan A Home with family   Discharge Plan B Home     [facility]  Initial Discharge Assessment       Primary Care Provider: No, Primary Doctor    Admission Diagnosis: Dizziness [R42]  Hyponatremia [E87.1]  Syncope [R55]  Chest pain [R07.9]  Orthostatic syncope [I95.1]    Admission Date: 1/16/2025  Expected Discharge Date:          Payor: AETNA MANAGED MEDICARE / Plan: AETNA MEDICARE DUAL DSNP / Product Type: Medicare Advantage /     Extended Emergency Contact Information  Primary Emergency Contact: Stacia Tabares  Address: [facility] [place] [address] [facility]  Home Phone: [phone]  Relation: Sister  Secondary Emergency Contact: Gail Tabares   [place]  Mobile Phone: [phone]  Relation: Relative    Discharge Plan A: (P) Home with family  Discharge Plan B: (P) Home      Local [employer] [id number] [address] [facility] & [facility] [place] [address] [address]  Phone: [phone] Fax: [phone]    Local [employer] [facility] #[id number] [place] [id number] [facility] & [address] [address]  Phone: [phone] Fax: [phone]      Initial Assessment (most recent)       Adult Discharge Assessment - 01/17/25 0802          Discharge Assessment    Assessment Type Discharge Planning Assessment     Confirmed/corrected address, phone number and insurance Yes     Confirmed Demographics Correct on Facesheet     Source of Information family     Communicated DELBERT with patient/caregiver Yes     Reason For Admission orthostatic syncope     People in Home sibling(s)     Facility Arrived From: home     Do you expect to return to your current living situation? Yes     Do you have help at home or someone to help you manage your care at home? Yes     Who are your caregiver(s) and their phone number(s)? Stacia  Rayshawn//[phone]     Prior to hospitilization cognitive status: Alert/Oriented (P)      Current cognitive status: Alert/Oriented (P)      Walking or Climbing Stairs Difficulty no (P)      Dressing/Bathing Difficulty no (P)      Equipment Currently Used at Home none (P)      Readmission within 30 days? No (P)      Patient currently being followed by outpatient case management? No (P)      Do you currently have service(s) that help you manage your care at home? No (P)      Do you take prescription medications? Yes (P)      Do you have prescription coverage? Yes (P)      Coverage AETNA MANAGED MEDICARE - AETNA MEDICARE DUAL DSNP (P)      Do you have any problems affording any of your prescribed medications? No (P)      Is the patient taking medications as prescribed? yes (P)      Who is going to help you get home at discharge? Stacia Rayshawn//[phone] (P)      How do you get to doctors appointments? health plan transportation (P)      Are you on dialysis? No (P)      Do you take coumadin? No (P)      Discharge Plan A Home with family (P)      Discharge Plan B Home (P)

## 2025-01-18 VITALS
DIASTOLIC BLOOD PRESSURE: 80 MMHG | WEIGHT: 101.44 LBS | HEIGHT: 65 IN | TEMPERATURE: 98 F | BODY MASS INDEX: 16.9 KG/M2 | OXYGEN SATURATION: 98 % | SYSTOLIC BLOOD PRESSURE: 112 MMHG | RESPIRATION RATE: 18 BRPM | HEART RATE: 83 BPM

## 2025-01-18 LAB
ANION GAP SERPL CALC-SCNC: 8 MMOL/L (ref 8–16)
BASOPHILS # BLD AUTO: 0.01 K/UL (ref 0–0.2)
BASOPHILS NFR BLD: 0.3 % (ref 0–1.9)
BUN SERPL-MCNC: 9 MG/DL (ref 8–23)
CALCIUM SERPL-MCNC: 8.6 MG/DL (ref 8.7–10.5)
CHLORIDE SERPL-SCNC: 100 MMOL/L (ref 95–110)
CO2 SERPL-SCNC: 26 MMOL/L (ref 23–29)
CREAT SERPL-MCNC: 0.8 MG/DL (ref 0.5–1.4)
DIFFERENTIAL METHOD BLD: ABNORMAL
EOSINOPHIL # BLD AUTO: 0 K/UL (ref 0–0.5)
EOSINOPHIL NFR BLD: 0 % (ref 0–8)
ERYTHROCYTE [DISTWIDTH] IN BLOOD BY AUTOMATED COUNT: 13 % (ref 11.5–14.5)
EST. GFR  (NO RACE VARIABLE): >60 ML/MIN/1.73 M^2
GLUCOSE SERPL-MCNC: 92 MG/DL (ref 70–110)
HCT VFR BLD AUTO: 29.8 % (ref 40–54)
HGB BLD-MCNC: 9.8 G/DL (ref 14–18)
IMM GRANULOCYTES # BLD AUTO: 0.02 K/UL (ref 0–0.04)
IMM GRANULOCYTES NFR BLD AUTO: 0.6 % (ref 0–0.5)
LYMPHOCYTES # BLD AUTO: 0.5 K/UL (ref 1–4.8)
LYMPHOCYTES NFR BLD: 15.3 % (ref 18–48)
MAGNESIUM SERPL-MCNC: 1.8 MG/DL (ref 1.6–2.6)
MCH RBC QN AUTO: 31.3 PG (ref 27–31)
MCHC RBC AUTO-ENTMCNC: 32.9 G/DL (ref 32–36)
MCV RBC AUTO: 95 FL (ref 82–98)
MONOCYTES # BLD AUTO: 0.3 K/UL (ref 0.3–1)
MONOCYTES NFR BLD: 11 % (ref 4–15)
NEUTROPHILS # BLD AUTO: 2.2 K/UL (ref 1.8–7.7)
NEUTROPHILS NFR BLD: 72.8 % (ref 38–73)
NRBC BLD-RTO: 0 /100 WBC
PLATELET # BLD AUTO: 270 K/UL (ref 150–450)
PMV BLD AUTO: 8.8 FL (ref 9.2–12.9)
POCT GLUCOSE: 103 MG/DL (ref 70–110)
POCT GLUCOSE: 107 MG/DL (ref 70–110)
POTASSIUM SERPL-SCNC: 4.1 MMOL/L (ref 3.5–5.1)
RBC # BLD AUTO: 3.13 M/UL (ref 4.6–6.2)
SODIUM SERPL-SCNC: 134 MMOL/L (ref 136–145)
WBC # BLD AUTO: 3.08 K/UL (ref 3.9–12.7)

## 2025-01-18 PROCEDURE — 85025 COMPLETE CBC W/AUTO DIFF WBC: CPT | Performed by: HOSPITALIST

## 2025-01-18 PROCEDURE — 25000003 PHARM REV CODE 250: Performed by: HOSPITALIST

## 2025-01-18 PROCEDURE — 80048 BASIC METABOLIC PNL TOTAL CA: CPT | Performed by: HOSPITALIST

## 2025-01-18 PROCEDURE — 36415 COLL VENOUS BLD VENIPUNCTURE: CPT | Performed by: HOSPITALIST

## 2025-01-18 PROCEDURE — 83735 ASSAY OF MAGNESIUM: CPT | Performed by: HOSPITALIST

## 2025-01-18 PROCEDURE — 25000003 PHARM REV CODE 250: Performed by: INTERNAL MEDICINE

## 2025-01-18 RX ORDER — NYSTATIN 100000 [USP'U]/ML
500000 SUSPENSION ORAL 4 TIMES DAILY
Qty: 100 ML | Refills: 0 | Status: SHIPPED | OUTPATIENT
Start: 2025-01-18 | End: 2025-01-23

## 2025-01-18 RX ORDER — SODIUM CHLORIDE 1 G/1
2000 TABLET ORAL 2 TIMES DAILY
Qty: 60 TABLET | Refills: 1 | Status: SHIPPED | OUTPATIENT
Start: 2025-01-18

## 2025-01-18 RX ORDER — LEVETIRACETAM 500 MG/1
500 TABLET ORAL 2 TIMES DAILY
Qty: 60 TABLET | Refills: 1 | Status: SHIPPED | OUTPATIENT
Start: 2025-01-18 | End: 2026-01-18

## 2025-01-18 RX ADMIN — RISPERIDONE 3 MG: 1 TABLET, FILM COATED ORAL at 09:01

## 2025-01-18 RX ADMIN — BENZTROPINE MESYLATE 1 MG: 1 TABLET ORAL at 09:01

## 2025-01-18 RX ADMIN — SODIUM CHLORIDE 2000 MG: 1 TABLET ORAL at 09:01

## 2025-01-18 RX ADMIN — LEVETIRACETAM 500 MG: 500 TABLET, FILM COATED ORAL at 09:01

## 2025-01-18 RX ADMIN — NYSTATIN 500000 UNITS: 100000 SUSPENSION ORAL at 09:01

## 2025-01-18 NOTE — PLAN OF CARE
I certify I provided patient choice and a list to the patient/family of CMS rated Home Health, SNF, IRF, LTACH, snf Nursing Homes .  Patient/Family signed Patient's Choice Disclosure Form choosing the following the first available.       01/18/25 1034   Post-Acute Status   Post-Acute Authorization Home Health   Home Health Status Set-up Complete/Auth obtained   Patient choice form signed by patient/caregiver List with quality metrics by geographic area provided;List from CMS Compare;List from System Post-Acute Care   Discharge Delays None known at this time

## 2025-01-18 NOTE — PLAN OF CARE
"Your fax has been successfully sent to [phone] at [phone].  ------------------------------------------------------------  From: [id number]  ------------------------------------------------------------  1/18/2025 12:00:12 PM Transmission Record          Sent to [phone] with remote ID "[email or web address]                 "          Result: (0/339;0/0) Success          Page record: 1 - 6          Elapsed time: 03:05 on channel 2    "

## 2025-01-18 NOTE — DISCHARGE INSTRUCTIONS
Take all medications as prescribed.  Eat a regular diet.  Follow up with your physicians as scheduled - pcp within 1 week.  Follow up with your oncologists as they direct.  We are ordering a home health service for you.  We placed a referral for you to get an appointment with neurology for follow-up of your seizures and new seizure medication  Thank you for trusting [facility] and Dr. Nix with your care.  We are honored that you entrusted us with your healthcare needs. Your satisfaction is very important to us and we hope you have been very pleased with your experience at [facility]. After your discharge you may receive a survey asking you to rate your hospital experience. We encourage you to take the time to complete the survey as your feedback allows us to identify areas for improvement as well as recognize our staff.   We hope that you have received the very best care possible during your hospitalization at [facility], as your satisfaction is our top priority.

## 2025-01-18 NOTE — PLAN OF CARE
[facility] ([place])      HOME HEALTH ORDERS  FACE TO FACE ENCOUNTER    Patient Name: Bulmaro Tabares  YOB: 1960    PCP: No, Primary Doctor   PCP Address: None  PCP Phone Number: None  PCP Fax: None    Encounter Date: 1/16/25    Admit to Home Health    Diagnoses:  Active Hospital Problems    Diagnosis  POA    *Orthostatic syncope [I95.1]  Yes     Priority: 1 - High    Hyponatremia [E87.1]  Yes     Priority: 2     Seizure disorder [G40.909]  Yes     Priority: 3     Primary cancer of left lower lobe of lung [C34.32]  Yes     Priority: 4     Malnutrition of moderate degree [E44.0]  Yes     Priority: 5     Debility [R53.81]  Yes     Priority: 6     Schizoaffective disorder [F25.9]  Yes    Bipolar disorder [F31.9]  Yes      Resolved Hospital Problems   No resolved problems to display.       Follow Up Appointments:  No future appointments.    Allergies:Review of patient's allergies indicates:  No Known Allergies    Medications: Review discharge medications with patient and family and provide education.       Medication List        START taking these medications      levETIRAcetam 500 MG Tab  Commonly known as: KEPPRA  Take 1 tablet (500 mg total) by mouth 2 (two) times daily.     nystatin 100,000 unit/mL suspension  Commonly known as: MYCOSTATIN  Take 5 mLs (500,000 Units total) by mouth 4 (four) times daily. for 5 days     sodium chloride 1,000 mg Tbso oral tablet  Take 2 tablets (2,000 mg total) by mouth 2 (two) times daily.            CONTINUE taking these medications      benztropine 1 MG tablet  Commonly known as: COGENTIN  Take 1 tablet by mouth 2 (two) times daily.     risperiDONE 3 MG Tab  Commonly known as: RISPERDAL  Take 1 tablet by mouth 2 (two) times daily.            STOP taking these medications      albuterol 90 mcg/actuation inhaler  Commonly known as: PROVENTIL/VENTOLIN HFA     carBAMazepine 200 mg tablet  Commonly known as: TEGRETOL                I have seen and examined this patient  within the last 30 days. My clinical findings that support the need for the home health skilled services and home bound status are the following:no   Weakness/numbness causing balance and gait disturbance due to Weakness/Debility, Malignancy/Cancer, and Dehydration making it taxing to leave home.     Diet:   regular diet    Labs:  SN to perform labs:  BMP: Weekly; 2 week(s) and send results to primary care provider    Referrals/ Consults  Physical Therapy to evaluate and treat. Evaluate for home safety and equipment needs; Establish/upgrade home exercise program. Perform / instruct on therapeutic exercises, gait training, transfer training, and Range of Motion.  Occupational Therapy to evaluate and treat. Evaluate home environment for safety and equipment needs. Perform/Instruct on transfers, ADL training, ROM, and therapeutic exercises.    Activities:   ambulate in house with assistance    Nursing:   Agency to admit patient within 24 hours of hospital discharge unless specified on physician order or at patient request    SN to complete comprehensive assessment including routine vital signs. Instruct on disease process and s/s of complications to report to MD. Review/verify medication list sent home with the patient at time of discharge  and instruct patient/caregiver as needed. Frequency may be adjusted depending on start of care date.     Skilled nurse to perform up to 3 visits PRN for symptoms related to diagnosis    Notify MD if SBP > 160 or < 90; DBP > 90 or < 50; HR > 120 or < 50; Temp > 101; O2 < 88%; Other:       Ok to schedule additional visits based on staff availability and patient request on consecutive days within the home health episode.    When multiple disciplines ordered:    Start of Care occurs on Sunday - Wednesday schedule remaining discipline evaluations as ordered on separate consecutive days following the start of care.    Thursday SOC -schedule subsequent evaluations Friday and Monday the  following week.     Friday - Saturday SOC - schedule subsequent discipline evaluations on consecutive days starting Monday of the following week.    For all post-discharge communication and subsequent orders please contact patient's primary care physician.     Home Health Aide:  Nursing Weekly, Physical Therapy Three times weekly, and Occupational Therapy Three times weekly    I certify that this patient is confined to his home and needs intermittent skilled nursing care, physical therapy, and occupational therapy.

## 2025-01-18 NOTE — PLAN OF CARE
Case Management Final Discharge Note      Discharge Disposition: Home with [facility]    New DME ordered / company name: Rolling walker/[employer] (will follow up and deliver on Monday)    Relevant SDOH / Transition of Care Barriers:  None    Person available to provide assistance at home when needed and their contact information: Self and Sister (Stacia Tabares)    Scheduled followup appointment: PCP on 1-24-25 at 10:30AM.  Appointment was noted on AVS    Referrals placed: None    Transportation: Family        Patient and family educated on discharge services and updated on DC plan. Bedside RN notified, patient clear to discharge from Case Management Perspective.      01/18/25 1156   Final Note   Assessment Type Final Discharge Note   Anticipated Discharge Disposition Home-Health   What phone number can be called within the next 1-3 days to see how you are doing after discharge? [phone]   Hospital Resources/Appts/Education Provided Provided patient/caregiver with written discharge plan information;Appointments scheduled and added to AVS   Post-Acute Status   Discharge Delays None known at this time     [facility] ([facility])  Discharge Final Note    Primary Care Provider: No, Primary Doctor    Expected Discharge Date: 1/18/2025    Final Discharge Note (most recent)       Final Note - 01/18/25 1156          Final Note    Assessment Type Final Discharge Note (P)      Anticipated Discharge Disposition Home-Health Care Svc (P)      What phone number can be called within the next 1-3 days to see how you are doing after discharge? [phone] (P)      Hospital Resources/Appts/Education Provided Provided patient/caregiver with written discharge plan information;Appointments scheduled and added to AVS (P)         Post-Acute Status    Discharge Delays None known at this time (P)                      Important Message from Medicare  Important Message from Medicare regarding Discharge Appeal Rights: Explained to  patient/caregiver, Signed/date by patient/caregiver     Date IMM was signed: 01/16/25  Time IMM was signed: 1115    Contact Info       Jennifer Childers MD   Specialty: Internal Medicine    [address] [address]   Phone: [phone]       Next Steps: Follow up on 1/24/2025    Instructions: 10:30 AM   Please call [phone] to confirm location    *[facility]   Specialty: Home Health Services, Home Therapy Services, Home Living Aide Services    [address], [address]  [facility] [place] [facility] [address]   Phone: [phone]       Next Steps: Follow up    Instructions: Will follow up with pt on 1-1920

## 2025-01-18 NOTE — NURSING
Pts sister remained at bedside for all of shift. DC instructions reviewed by dc nurse with sister and patient. IV removed with catheter intact. Awaiting transport via wheelchair for DC. Will continue to monitor until they leave

## 2025-01-19 ENCOUNTER — LAB VISIT (OUTPATIENT)
Dept: LAB | Facility: HOSPITAL | Age: 65
End: 2025-01-19
Attending: STUDENT IN AN ORGANIZED HEALTH CARE EDUCATION/TRAINING PROGRAM
Payer: MEDICARE

## 2025-01-19 DIAGNOSIS — E89.1 POSTSURGICAL HYPOINSULINEMIA: Primary | ICD-10-CM

## 2025-01-19 DIAGNOSIS — E89.1 POSTSURGICAL HYPOINSULINEMIA: ICD-10-CM

## 2025-01-19 LAB
ANION GAP SERPL CALC-SCNC: 7 MMOL/L (ref 8–16)
BUN SERPL-MCNC: 11 MG/DL (ref 8–23)
CALCIUM SERPL-MCNC: 8.5 MG/DL (ref 8.7–10.5)
CHLORIDE SERPL-SCNC: 100 MMOL/L (ref 95–110)
CO2 SERPL-SCNC: 26 MMOL/L (ref 23–29)
CREAT SERPL-MCNC: 0.8 MG/DL (ref 0.5–1.4)
EST. GFR  (NO RACE VARIABLE): >60 ML/MIN/1.73 M^2
GLUCOSE SERPL-MCNC: 91 MG/DL (ref 70–110)
POTASSIUM SERPL-SCNC: 4.1 MMOL/L (ref 3.5–5.1)
SODIUM SERPL-SCNC: 133 MMOL/L (ref 136–145)

## 2025-01-19 PROCEDURE — 80048 BASIC METABOLIC PNL TOTAL CA: CPT | Performed by: STUDENT IN AN ORGANIZED HEALTH CARE EDUCATION/TRAINING PROGRAM

## 2025-01-20 NOTE — PLAN OF CARE
Bulmaro's mobility limitation cannot be sufficiently resolved by the use of a cane. His functional mobility deficit can be sufficiently resolved with the use of a Walker. Patient's mobility limitation significantly impairs their ability to participate in one of more activities of daily living.  The use of a Walker will significantly improve the patient's ability to participate in [facility]ADLS and the patient will use it on regular basis in the home.

## 2025-01-20 NOTE — PLAN OF CARE
[facility] ([facility]) requesting clinicals & justification for rolling walker approval - forwarded via EPIC

## 2025-01-27 ENCOUNTER — HOSPITAL ENCOUNTER (EMERGENCY)
Facility: OTHER | Age: 65
Discharge: HOME | End: 2025-01-27
Attending: EMERGENCY MEDICINE
Payer: MEDICARE

## 2025-01-27 VITALS
HEIGHT: 65 IN | WEIGHT: 102 LBS | BODY MASS INDEX: 17 KG/M2 | OXYGEN SATURATION: 100 % | TEMPERATURE: 98 F | HEART RATE: 98 BPM | SYSTOLIC BLOOD PRESSURE: 115 MMHG | DIASTOLIC BLOOD PRESSURE: 63 MMHG | RESPIRATION RATE: 18 BRPM

## 2025-01-27 DIAGNOSIS — Z48.02: Primary | ICD-10-CM

## 2025-01-27 PROCEDURE — 99999 HC NO LEVEL OF SERVICE - ED ONLY: CPT

## 2025-01-27 NOTE — DISCHARGE INSTRUCTIONS
You were seen and evaluated in the ER today.  We have removed your staples with no difficulty.  Please keep area clean and dry.  Can wash with gentle soap and water.  Please follow-up with your PCP as needed.  Please return to the ED for any worsening symptoms such as chest pain, shortness of breath, fever not controlled with Tylenol or ibuprofen or uncontrolled pain.      Our goal in the emergency department is to always give you outstanding care and exceptional service. You may receive a survey by mail or e-mail in the next week regarding your experience in our ED. We would greatly appreciate your completing and returning the survey. Your feedback provides us with a way to recognize our staff who give very good care and it helps us learn how to improve when your experience was below our aspiration of excellence.

## 2025-01-27 NOTE — ED PROVIDER NOTES
"Source of History:  Patient    Chief complaint:  Suture / Staple Removal (Requesting staple removal to back of head. Placed Thursday. Denies problems. )      HPI:  Bulmaro Tabares is a 64 y.o. male presenting with 3 staples on his right postero-parietal scalp requesting removal. Patient reports falling and having staples placed for laceration. Patient saw primary care doctor 3 days ago and was told patient is ready to get staples removed. Patient denies fevers and chills.    This is the extent to the patients complaints today here in the emergency department.    ROS: As per HPI and below:  Constitutional: No fever.  No chills.  Eyes: No visual changes.   ENT: No sore throat. No ear pain.  Urinary: No abnormal urination.  MSK: No back pain. No joint pain.   Integument: Positive for scalp staples    Review of patient's allergies indicates:  No Known Allergies    PMH:  As per HPI and below:  Past Medical History:   Diagnosis Date    Bipolar disorder     Hyponatremia     Laryngeal cancer     Lung cancer     Schizoaffective disorder     Seizure disorder      No past surgical history on file.    Social History     Tobacco Use    Smoking status: Former     Current packs/day: 0.00     Types: Cigarettes     Quit date: 6/15/2024     Years since quittin.6   Substance Use Topics    Alcohol use: No    Drug use: No       Physical Exam:    /63   Pulse 98   Temp 98.1 °F (36.7 °C) (Oral)   Resp 18   Ht 5' 5" (1.651 m)   Wt 46.3 kg (102 lb)   SpO2 100%   BMI 16.97 kg/m²   Nursing note and vital signs reviewed.  Constitutional: No acute distress.  Nontoxic  Eyes: No conjunctival injection. Extraocular muscles intact.  ENT: Normal phonation.  Musculoskeletal: Good range of motion all joints.  No deformities.  Neck supple.  No meningismus. Neurovascularly intact.  Skin:  Well-healed laceration noted to posterior scalp.  Wound is well approximated.  No redness, erythema or drainage.  Three staples noted.  Removed with no " difficulty.  Psych:  Alert and oriented x 3.  Appropriate, conversant.    [facility]    Emergent evaluation of a 65 yo male presenting for staple removal.  Patient states he had a syncopal episode and was seen at this facility on 1/16 and had staples placed.  Patient has had no redness, drainage or difficulty with wound since that time.  Patient is here for staple removal.  On exam pt is A&Ox3. VSS. Nonfebrile and nontoxic appearing.  Mucous membranes pink and moist. Pt speaking in full sentences.  Steady gait appreciated. Cap refill < 3 seconds.  Well-healed laceration noted to posterior scalp.  Wound is well approximated.  No redness, erythema or drainage.  Three staples noted.  Removed with no difficulty.    History Acquisition   Additional history was acquired from other historians.  Family, chart  The patient's list of active medical problems, social history, medications, and allergies as documented per RN staff has been reviewed.     Differential Diagnoses   Based on available information and the initial assessment, the working differential diagnoses considered during this evaluation include but are not limited to wound check, suture removal, staple removal, others.    I will remove staples and discharged home      Procedures   Suture Removal    Date/Time: 1/27/2025 10:59 AM  Location procedure was performed: [facility] [place] EMERGENCY DEPARTMENT    Performed by: Socorro Au NP  Authorized by: Rayshawn Layne MD  Body area: head/neck  Location details: scalp  Wound Appearance: clean, well healed, normal color, nontender and no drainage  Sutures Removed: 0  Staples Removed: 3  Post-removal: no dressing applied  Facility: sutures placed in this facility  Patient tolerance: Patient tolerated the procedure well with no immediate complications           Additional Consideration   All available testing was considered during the course of this workup.  Comorbidities taken into consideration during the patient's evaluation and  treatment include weight, age.    Social determinants of health were taken into consideration during development of our treatment plan.    Medications - No data to display   ED Course as of 01/27/25 1103   Mon Jan 27, 2025   1100 Staples removed with no difficulty.  Patient advised to keep area clean and dry.  Can wash with gentle soap and water.  Advised to follow up with PCP as needed.  Return precautions discussed.  Patient verbalized understanding of this plan of care.  All questions and concerns addressed. [RZ]   1103 Patient is hemodynamically stable, vital signs are normal. Discharge instructions given. Return to ED precautions discussed. Follow up as directed. Pt verbalized understanding of this plan.  Pt is stable for discharge.  [RZ]      ED Course User Index  [RZ] Socorro Au NP             CLINICAL IMPRESSION  1. Encounter for removal of staples         ED Disposition Condition    Discharge Stable            Instruction:  I see no indication of an emergent process beyond that addressed during our encounter but have duly counseled the patient/family regarding the need for prompt follow-up as well as the indications that should prompt immediate return to the emergency room should new or worrisome developments occur.  The patient/family has been provided with verbal and printed direction regarding our final diagnosis(es) as well as instructions regarding use of OTC and/or Rx medications intended to manage the patient's aforementioned conditions including:  ED Prescriptions    None       Patient has been advised of following recommended follow-up instructions:  Follow-up Information       Follow up With Specialties Details Why Contact Info    PCP  Schedule an appointment as soon as possible for a visit  As needed           The patient/family communicates understanding of all this information and all remaining questions and concerns were addressed at this time.      The patient's condition did not  warrant review of the  and prescription of controlled substances.      This note was created using dictation software.  This program may occasionally mistype words and phrases.         Socorro Au, JUAN  01/27/25 1101